# Patient Record
Sex: MALE | Race: WHITE | NOT HISPANIC OR LATINO | Employment: OTHER | URBAN - METROPOLITAN AREA
[De-identification: names, ages, dates, MRNs, and addresses within clinical notes are randomized per-mention and may not be internally consistent; named-entity substitution may affect disease eponyms.]

---

## 2018-06-27 ENCOUNTER — COMMUNICATION - HEALTHEAST (OUTPATIENT)
Dept: TELEHEALTH | Facility: CLINIC | Age: 43
End: 2018-06-27

## 2018-06-27 ENCOUNTER — OFFICE VISIT - HEALTHEAST (OUTPATIENT)
Dept: FAMILY MEDICINE | Facility: CLINIC | Age: 43
End: 2018-06-27

## 2018-06-27 DIAGNOSIS — H81.10 BPPV (BENIGN PAROXYSMAL POSITIONAL VERTIGO): ICD-10-CM

## 2018-06-27 DIAGNOSIS — F32.5 MAJOR DEPRESSION IN COMPLETE REMISSION (H): ICD-10-CM

## 2018-06-27 ASSESSMENT — MIFFLIN-ST. JEOR: SCORE: 1739.94

## 2019-03-29 ENCOUNTER — OFFICE VISIT - HEALTHEAST (OUTPATIENT)
Dept: FAMILY MEDICINE | Facility: CLINIC | Age: 44
End: 2019-03-29

## 2019-03-29 DIAGNOSIS — H91.92 HEARING LOSS OF LEFT EAR, UNSPECIFIED HEARING LOSS TYPE: ICD-10-CM

## 2019-03-29 DIAGNOSIS — H93.13 TINNITUS, BILATERAL: ICD-10-CM

## 2019-03-29 DIAGNOSIS — R42 DIZZINESS: ICD-10-CM

## 2019-03-29 DIAGNOSIS — R09.81 CONGESTION OF PARANASAL SINUS: ICD-10-CM

## 2019-04-16 ENCOUNTER — OFFICE VISIT - HEALTHEAST (OUTPATIENT)
Dept: OTOLARYNGOLOGY | Facility: CLINIC | Age: 44
End: 2019-04-16

## 2019-04-16 DIAGNOSIS — H93.13 TINNITUS OF BOTH EARS: ICD-10-CM

## 2019-04-16 DIAGNOSIS — G50.1 ATYPICAL FACIAL PAIN: ICD-10-CM

## 2019-05-08 ENCOUNTER — RECORDS - HEALTHEAST (OUTPATIENT)
Dept: ADMINISTRATIVE | Facility: OTHER | Age: 44
End: 2019-05-08

## 2019-05-25 ENCOUNTER — COMMUNICATION - HEALTHEAST (OUTPATIENT)
Dept: FAMILY MEDICINE | Facility: CLINIC | Age: 44
End: 2019-05-25

## 2019-05-25 DIAGNOSIS — R42 DIZZINESS: ICD-10-CM

## 2019-05-25 DIAGNOSIS — H91.92 HEARING LOSS OF LEFT EAR, UNSPECIFIED HEARING LOSS TYPE: ICD-10-CM

## 2019-05-25 DIAGNOSIS — R09.81 CONGESTION OF PARANASAL SINUS: ICD-10-CM

## 2019-05-25 DIAGNOSIS — H93.13 TINNITUS, BILATERAL: ICD-10-CM

## 2019-12-17 ENCOUNTER — OFFICE VISIT - HEALTHEAST (OUTPATIENT)
Dept: FAMILY MEDICINE | Facility: CLINIC | Age: 44
End: 2019-12-17

## 2019-12-17 DIAGNOSIS — G51.0 BELL'S PALSY: ICD-10-CM

## 2019-12-17 ASSESSMENT — ANXIETY QUESTIONNAIRES
4. TROUBLE RELAXING: NOT AT ALL
1. FEELING NERVOUS, ANXIOUS, OR ON EDGE: SEVERAL DAYS
GAD7 TOTAL SCORE: 1
7. FEELING AFRAID AS IF SOMETHING AWFUL MIGHT HAPPEN: NOT AT ALL
5. BEING SO RESTLESS THAT IT IS HARD TO SIT STILL: NOT AT ALL
2. NOT BEING ABLE TO STOP OR CONTROL WORRYING: NOT AT ALL
6. BECOMING EASILY ANNOYED OR IRRITABLE: NOT AT ALL
IF YOU CHECKED OFF ANY PROBLEMS ON THIS QUESTIONNAIRE, HOW DIFFICULT HAVE THESE PROBLEMS MADE IT FOR YOU TO DO YOUR WORK, TAKE CARE OF THINGS AT HOME, OR GET ALONG WITH OTHER PEOPLE: NOT DIFFICULT AT ALL
3. WORRYING TOO MUCH ABOUT DIFFERENT THINGS: NOT AT ALL

## 2019-12-17 ASSESSMENT — MIFFLIN-ST. JEOR: SCORE: 1712.72

## 2019-12-17 ASSESSMENT — PATIENT HEALTH QUESTIONNAIRE - PHQ9: SUM OF ALL RESPONSES TO PHQ QUESTIONS 1-9: 1

## 2019-12-31 ENCOUNTER — OFFICE VISIT - HEALTHEAST (OUTPATIENT)
Dept: FAMILY MEDICINE | Facility: CLINIC | Age: 44
End: 2019-12-31

## 2019-12-31 DIAGNOSIS — F32.5 MAJOR DEPRESSION IN COMPLETE REMISSION (H): ICD-10-CM

## 2019-12-31 DIAGNOSIS — Z00.00 ROUTINE GENERAL MEDICAL EXAMINATION AT A HEALTH CARE FACILITY: ICD-10-CM

## 2019-12-31 RX ORDER — FEXOFENADINE HCL 60 MG/1
60 TABLET, FILM COATED ORAL DAILY
Status: SHIPPED | COMMUNITY
Start: 2019-12-31 | End: 2024-01-28

## 2019-12-31 ASSESSMENT — MIFFLIN-ST. JEOR: SCORE: 1708.19

## 2020-11-04 ENCOUNTER — OFFICE VISIT - HEALTHEAST (OUTPATIENT)
Dept: FAMILY MEDICINE | Facility: CLINIC | Age: 45
End: 2020-11-04

## 2020-11-04 DIAGNOSIS — F32.5 MAJOR DEPRESSION IN COMPLETE REMISSION (H): ICD-10-CM

## 2020-11-04 DIAGNOSIS — F41.1 GAD (GENERALIZED ANXIETY DISORDER): ICD-10-CM

## 2020-11-04 RX ORDER — LORAZEPAM 0.5 MG/1
0.5 TABLET ORAL EVERY 6 HOURS PRN
Qty: 15 TABLET | Refills: 0 | Status: SHIPPED | OUTPATIENT
Start: 2020-11-04 | End: 2023-05-12

## 2020-11-26 ENCOUNTER — COMMUNICATION - HEALTHEAST (OUTPATIENT)
Dept: FAMILY MEDICINE | Facility: CLINIC | Age: 45
End: 2020-11-26

## 2020-11-26 DIAGNOSIS — F32.5 MAJOR DEPRESSION IN COMPLETE REMISSION (H): ICD-10-CM

## 2020-11-26 DIAGNOSIS — F41.1 GAD (GENERALIZED ANXIETY DISORDER): ICD-10-CM

## 2020-12-10 ENCOUNTER — OFFICE VISIT - HEALTHEAST (OUTPATIENT)
Dept: FAMILY MEDICINE | Facility: CLINIC | Age: 45
End: 2020-12-10

## 2020-12-10 DIAGNOSIS — Z13.0 SCREENING FOR DEFICIENCY ANEMIA: ICD-10-CM

## 2020-12-10 DIAGNOSIS — H00.014 HORDEOLUM EXTERNUM LEFT UPPER EYELID: ICD-10-CM

## 2020-12-10 DIAGNOSIS — F41.1 GAD (GENERALIZED ANXIETY DISORDER): ICD-10-CM

## 2020-12-10 DIAGNOSIS — F33.41 RECURRENT MAJOR DEPRESSIVE DISORDER, IN PARTIAL REMISSION (H): ICD-10-CM

## 2020-12-10 DIAGNOSIS — Z13.220 SCREENING FOR CHOLESTEROL LEVEL: ICD-10-CM

## 2020-12-10 DIAGNOSIS — Z13.21 ENCOUNTER FOR VITAMIN DEFICIENCY SCREENING: ICD-10-CM

## 2020-12-10 DIAGNOSIS — Z13.1 SCREENING FOR DIABETES MELLITUS: ICD-10-CM

## 2020-12-10 DIAGNOSIS — Z00.00 ROUTINE GENERAL MEDICAL EXAMINATION AT A HEALTH CARE FACILITY: ICD-10-CM

## 2020-12-10 LAB
ANION GAP SERPL CALCULATED.3IONS-SCNC: 8 MMOL/L (ref 5–18)
BUN SERPL-MCNC: 17 MG/DL (ref 8–22)
CALCIUM SERPL-MCNC: 9.3 MG/DL (ref 8.5–10.5)
CHLORIDE BLD-SCNC: 105 MMOL/L (ref 98–107)
CHOLEST SERPL-MCNC: 126 MG/DL
CO2 SERPL-SCNC: 29 MMOL/L (ref 22–31)
CREAT SERPL-MCNC: 0.93 MG/DL (ref 0.7–1.3)
ERYTHROCYTE [DISTWIDTH] IN BLOOD BY AUTOMATED COUNT: 11.2 % (ref 11–14.5)
FASTING STATUS PATIENT QL REPORTED: YES
GFR SERPL CREATININE-BSD FRML MDRD: >60 ML/MIN/1.73M2
GLUCOSE BLD-MCNC: 88 MG/DL (ref 70–125)
HCT VFR BLD AUTO: 45.4 % (ref 40–54)
HDLC SERPL-MCNC: 59 MG/DL
HGB BLD-MCNC: 15.9 G/DL (ref 14–18)
LDLC SERPL CALC-MCNC: 55 MG/DL
MCH RBC QN AUTO: 31.6 PG (ref 27–34)
MCHC RBC AUTO-ENTMCNC: 35 G/DL (ref 32–36)
MCV RBC AUTO: 90 FL (ref 80–100)
PLATELET # BLD AUTO: 165 THOU/UL (ref 140–440)
PMV BLD AUTO: 8.5 FL (ref 7–10)
POTASSIUM BLD-SCNC: 4.2 MMOL/L (ref 3.5–5)
RBC # BLD AUTO: 5.02 MILL/UL (ref 4.4–6.2)
SODIUM SERPL-SCNC: 142 MMOL/L (ref 136–145)
TRIGL SERPL-MCNC: 62 MG/DL
WBC: 5.1 THOU/UL (ref 4–11)

## 2020-12-10 RX ORDER — ESCITALOPRAM OXALATE 10 MG/1
10 TABLET ORAL DAILY
Qty: 90 TABLET | Refills: 3 | Status: SHIPPED | OUTPATIENT
Start: 2020-12-10 | End: 2022-01-03

## 2020-12-10 ASSESSMENT — ANXIETY QUESTIONNAIRES
2. NOT BEING ABLE TO STOP OR CONTROL WORRYING: NOT AT ALL
1. FEELING NERVOUS, ANXIOUS, OR ON EDGE: NOT AT ALL
3. WORRYING TOO MUCH ABOUT DIFFERENT THINGS: NOT AT ALL
5. BEING SO RESTLESS THAT IT IS HARD TO SIT STILL: NOT AT ALL
GAD7 TOTAL SCORE: 0
6. BECOMING EASILY ANNOYED OR IRRITABLE: NOT AT ALL
7. FEELING AFRAID AS IF SOMETHING AWFUL MIGHT HAPPEN: NOT AT ALL
4. TROUBLE RELAXING: NOT AT ALL

## 2020-12-10 ASSESSMENT — PATIENT HEALTH QUESTIONNAIRE - PHQ9: SUM OF ALL RESPONSES TO PHQ QUESTIONS 1-9: 2

## 2020-12-10 ASSESSMENT — MIFFLIN-ST. JEOR: SCORE: 1705.58

## 2020-12-11 LAB
25(OH)D3 SERPL-MCNC: 45.8 NG/ML (ref 30–80)
25(OH)D3 SERPL-MCNC: 45.8 NG/ML (ref 30–80)

## 2021-03-27 ENCOUNTER — COMMUNICATION - HEALTHEAST (OUTPATIENT)
Dept: FAMILY MEDICINE | Facility: CLINIC | Age: 46
End: 2021-03-27

## 2021-04-12 ENCOUNTER — OFFICE VISIT - HEALTHEAST (OUTPATIENT)
Dept: FAMILY MEDICINE | Facility: CLINIC | Age: 46
End: 2021-04-12

## 2021-04-12 DIAGNOSIS — F41.1 GAD (GENERALIZED ANXIETY DISORDER): ICD-10-CM

## 2021-04-12 DIAGNOSIS — F32.5 MAJOR DEPRESSION IN COMPLETE REMISSION (H): ICD-10-CM

## 2021-04-12 DIAGNOSIS — F80.89 SOCIAL COMMUNICATION DISORDER: ICD-10-CM

## 2021-04-15 ENCOUNTER — COMMUNICATION - HEALTHEAST (OUTPATIENT)
Dept: FAMILY MEDICINE | Facility: CLINIC | Age: 46
End: 2021-04-15

## 2021-05-26 ASSESSMENT — PATIENT HEALTH QUESTIONNAIRE - PHQ9
SUM OF ALL RESPONSES TO PHQ QUESTIONS 1-9: 2
SUM OF ALL RESPONSES TO PHQ QUESTIONS 1-9: 1

## 2021-05-27 NOTE — PROGRESS NOTES
HPI: This patient is a 45yo M who presents to the clinic for evaluation of episodic ear pain at the request of Dr. Azevedo. He has a bit of a complex history of L>R tinnitus that was worked up a few years ago in [place]. Per his history, nothing was found in his ear or on the hearing nerve, but he did have a something in his brain that was watched with repeat MRIs and didn't change; he was told not to worry about it, but does not know what he was diagnosed with. He has had symptoms of facial pressure, ear pressure, lightheadedness and mental fogginess that were worse a few months ago. He was going through more stress at that time, but is unaware of dental grinding or clenching.  He does state that his tinnitus has a pulsatile quality when he works out. This quality change has been present since before he had all of his scans and no vascular issues were identified per his history. Denies otorrhea, hearing loss, tinnitus, true vertigo, and other major symptoms such as fever, weight loss, odynophagia, dysphagia, and hemoptysis.     Past medical history, surgical history, social history, family history, medications, and allergies have been reviewed with the patient and are documented above.    Review of Systems: a 10-system review was performed. Pertinent positives are noted in the HPI and on a separate scanned document in the chart.    PHYSICAL EXAMINATION:  GEN: no acute distress, normocephalic  EYES: extraocular movements are intact, pupils are equal and round. Sclera clear.   EARS: auricles are normally formed. The external auditory canals are clear with minimal to no cerumen. Tympanic membranes are intact bilaterally with no signs of infection, effusion, retractions, or perforations.  NOSE: anterior nares are patent. There are no masses or lesions. The septum is non-obstructing.  OC/OP: clear, dentition is in good repair but with mild flattening and wear facets on the occlusal surfaces. The tongue and palate are  fully mobile and symmetric. No masses or lesions.  NECK: soft and supple. No lymphadenopathy or masses. Airway is midline. Mild tenderness of the bilateral TMJ.  NEURO: CN VII and XII symmetric. alert and oriented. No spontaneous nystagmus. Gait is normal.  PULM: breathing comfortably on room air, normal chest expansion with respiration  CARDS: no cyanosis or clubbing. Normal carotid pulses.    MEDICAL DECISION-MAKING: This patient is a 45yo M with a constellation of symptoms that is pretty indicative of TMD. Discussed soft diet, NSAIDS, and a bite guard. If these measures do not make an impact in his symptoms, we can further investigate with an audiogram and new scans. Did discuss repeating scans now, but he was not interested in that at this time, as the first time around for this was quite stressful for him. He will continue his nasal spray for now has it has helped a little with mild allergic nasal congestion. Did advise him to use the adult dosing, which is 2 sprays per nostril daily.

## 2021-05-27 NOTE — PROGRESS NOTES
[facility] Clinic Note    Patient Name: Gregory Nolasco  Patient Age: 44 y.o.  YOB: 1975  MRN: [id number]    Date of visit: 3/29/2019    Assessment/Plan:  No results found for this or any previous visit (from the past 24 hour(s)).  No medications were ordered this encounter      ICD-10-CM    1. Congestion of paranasal sinus R09.81    2. Hearing loss of left ear, unspecified hearing loss type H91.92    3. Dizziness R42        He does seem to have some decreased hearing on the left side, will have him see ear nose and throat for evaluation for this.  Dizziness seems to be associated with sinus congestion so we will try treating that and see if the symptoms get better.  We discussed using Flonase and/or sinus rinses.  He tolerated the Flonase previously so we will try that again.      Counseled patient regarding treatments, treatment options, risks and benefits and diagnosis.  The patient was interactive, attentive, verbalized understanding, and we discussed plan.       Patient Active Problem List   Diagnosis     Major depression in complete remission (H)     BPPV (benign paroxysmal positional vertigo)     Social History     Social History Narrative     Not on file     Family History   Problem Relation Age of Onset     Arthritis Mother      Depression Father      Hypertension Father      Depression Sister      Depression Paternal Uncle      Stroke Maternal Grandmother      Arthritis Maternal Grandfather      Depression Paternal Grandmother      Outpatient Encounter Medications as of 3/29/2019   Medication Sig Dispense Refill     fluticasone (FLONASE ALLERGY RELIEF) 50 mcg/actuation nasal spray 1 spray into each nostril daily.       [DISCONTINUED] DULoxetine (CYMBALTA) 30 MG capsule Take 1 capsule (30 mg total) by mouth daily. 90 capsule 0     No facility-administered encounter medications on file as of 3/29/2019.        Chief Complaint:   Chief Complaint   Patient presents with     Nasal Congestion      pt states ongoing sinus issue and sx      Facial Pain     Dizziness     pt states having dizziness with sinus sx which is why pt is here        /82 (Patient Site: Left Arm, Patient Position: Sitting, Cuff Size: Adult Regular)   Pulse 100   Resp 16   Wt 179 lb (81.2 kg)   BMI 22.98 kg/m    HPI:   Has had sinus congestion since January.  Has had one URI February.      Description: feels like room is slowly moving.  Lasts couple hours. Only has with nasal congestion. None now.   Head injury or other known inciting event: no  Nausea or vomiting: mild nausea    Has tinnitus bilateral x 20 years, has been to ENT, neg MRI.  Hearing has been ok.      History of vertigo: last summer    Headache: no    History of cva, diabetes, known cardiovascular disease: no  diplopia, dysarthria, dysphagia, weakness, or numbness: no      Able to walk without falling: Yes    At present, nasal congestion is present.  Tried flonase, seemed to be effective but stopped.      Has not done sinus rinses.    Feels some pressure over bridge of nose, this comes and goes.       ROS: Pertinent ros findings in hpi, all other systems negative.    Objective/Physical Exam:     /82 (Patient Site: Left Arm, Patient Position: Sitting, Cuff Size: Adult Regular)   Pulse 100   Resp 16   Wt 179 lb (81.2 kg)   BMI 22.98 kg/m      Heart: Regular rhythm, no rubs or gallops.  Normal rate: y  Murmur: n    Lungs:   Clear to auscultation bilaterally: y  Wheeze: n  Rhonchi: n  Crackles: n  Area of decreased breath sounds: n  Labored breathing: n      Left eye:  Conjunctival erythema: n  Purulent drainage: n  Proptosis:n    Right eye:  Conjunctival erythema: n  Purulent drainage: n  Proptosis: n    Right tympanic membrane:   color: pale  ossicles visualized: y  umbo distorted: n  cone of light distorted: n  Air/fluid levels: n  Drainage:n  Canal: not edematous no discharge  Pain with external ear manipulation: n  Foreign body: n    Left tympanic  membrane:   color: pale  ossicles visualized: y  umbo distorted: n  cone of light distorted: n  Air/fluid levels: n  Drainage: n  Canal: not edematous no discharge  Pain with external ear manipulation: n  Foreign body: n    No auricular protrusion or erythema/swelling over mastoid area bilaterally.    No white patches that scrape off, no deviation of uvula. no ulcerations noted.    No torticollis, neck stiffness, drooling, muffled/hot potato voice, submandibular swelling, trismus or stridor.    Pharynx:   Erythema: n  Pus pockets: n  Tender cervical lymphadenopathy: n    Well appearing: y  Moist mucous membranes: y    MSK: no muscle or joint swelling  Neuro: no dysarthria or gross asymmetry  Psych: full affect, oriented x 3  Hematologic: no petechiae or purpura    Skin: No rash.     Tender over left maxillary sinus: no  Tender over right maxillary sinus: no  Tender over left frontal sinus: no  Tender over right frontal sinus: no    EOMI, no proptosis    Nares patent    Extraocular movements intact, eyes track equally in all directions. Pupils same size bilaterally and equally reactive to light: y  Finger rub hearing is present and the same bilaterally: slight decreased left  Slurring or other significant speech abnormality: n  Smile symmetric bilaterally: y      Bilateral upper and lower extremity strength:  Shoulder abduction and adduction: 5/5  Elbow flexion/extension: 5/5  Wrist flexion/extension: 5/5   5/5        Knee extension: 5/5    Patellar DTR's: 2/4 bilaterally      Tone: normal      Dysdiadochokinesis: n    Pronator drift: absent    Cognition: Alert and oriented x 3    No nystagmus noted.      Skew deviation: no          Richmond Stoner MD

## 2021-05-28 ASSESSMENT — ANXIETY QUESTIONNAIRES
GAD7 TOTAL SCORE: 1
GAD7 TOTAL SCORE: 0

## 2021-05-29 NOTE — TELEPHONE ENCOUNTER
Refill Approved    Rx renewed per Medication Renewal Policy. Medication was last renewed on 3/29/19.    Tamra Orozco, [facility] Triage/Med Refill 5/26/2019     Requested Prescriptions   Pending Prescriptions Disp Refills     fluticasone propionate (FLONASE) 50 mcg/actuation nasal spray [Pharmacy Med Name: FLUTICASONE PROP 50 MCG SPRAY]  0     Sig: SPRAY 1 SPRAY INTO EACH NOSTRIL EVERY DAY       Nasal Steroid Refill Protocol Passed - 5/25/2019  8:27 AM        Passed - Patient has had office visit/physical in last 2 years     Last office visit with prescriber/PCP: 3/29/2019 OR same dept: 3/29/2019 Richmond Stoner MD OR same specialty: 3/29/2019 Richmond Stoner MD Last physical: Visit date not found Last MTM visit: Visit date not found    Next appt within 3 mo: Visit date not found  Next physical within 3 mo: Visit date not found  Prescriber OR PCP: Richmond Stoner MD  Last diagnosis associated with med order: 1. Congestion of paranasal sinus  - fluticasone propionate (FLONASE) 50 mcg/actuation nasal spray [Pharmacy Med Name: FLUTICASONE PROP 50 MCG SPRAY]; SPRAY 1 SPRAY INTO EACH NOSTRIL EVERY DAY; Refill: 0    2. Hearing loss of left ear, unspecified hearing loss type  - fluticasone propionate (FLONASE) 50 mcg/actuation nasal spray [Pharmacy Med Name: FLUTICASONE PROP 50 MCG SPRAY]; SPRAY 1 SPRAY INTO EACH NOSTRIL EVERY DAY; Refill: 0    3. Dizziness  - fluticasone propionate (FLONASE) 50 mcg/actuation nasal spray [Pharmacy Med Name: FLUTICASONE PROP 50 MCG SPRAY]; SPRAY 1 SPRAY INTO EACH NOSTRIL EVERY DAY; Refill: 0    4. Tinnitus, bilateral  - fluticasone propionate (FLONASE) 50 mcg/actuation nasal spray [Pharmacy Med Name: FLUTICASONE PROP 50 MCG SPRAY]; SPRAY 1 SPRAY INTO EACH NOSTRIL EVERY DAY; Refill: 0     If protocol passes may refill for 12 months if within 3 months of last provider visit (or a total of 15 months).

## 2021-06-01 VITALS — HEIGHT: 74 IN | BODY MASS INDEX: 22.07 KG/M2 | WEIGHT: 172 LBS

## 2021-06-02 ENCOUNTER — RECORDS - HEALTHEAST (OUTPATIENT)
Dept: ADMINISTRATIVE | Facility: OTHER | Age: 46
End: 2021-06-02

## 2021-06-02 VITALS — WEIGHT: 179 LBS | BODY MASS INDEX: 22.98 KG/M2

## 2021-06-03 VITALS
OXYGEN SATURATION: 96 % | DIASTOLIC BLOOD PRESSURE: 62 MMHG | WEIGHT: 166 LBS | BODY MASS INDEX: 21.3 KG/M2 | HEART RATE: 81 BPM | HEIGHT: 74 IN | SYSTOLIC BLOOD PRESSURE: 138 MMHG

## 2021-06-03 VITALS
HEART RATE: 76 BPM | BODY MASS INDEX: 21.17 KG/M2 | WEIGHT: 165 LBS | SYSTOLIC BLOOD PRESSURE: 106 MMHG | DIASTOLIC BLOOD PRESSURE: 70 MMHG | HEIGHT: 74 IN

## 2021-06-04 NOTE — PROGRESS NOTES
"Assessment & Plan   1. Bell's palsy  Discussed etiology, treatment, expectations for resolution of symptoms. He has already begun to see significant improvement.  Symptoms now very mild. Will complete prednisone and valacyclovir tomorrow. Recommended ocular lubricant.  Recheck in two weeks when he will be back for his physical.   - propylene glycol (SYSTANE COMPLETE) 0.6 % Drop; Apply 2 drops to eye 3 (three) times a day.  Dispense: 1 Bottle; Refill: 1    Maame Azevedo CNP    Subjective   Chief Complaint:  Follow-up (Bell's Palsy , regions, 12/11/19)    HPI:   Gregory Nolasco is a 44 y.o. male who presents for follow up for [place] Palsy.      He was seen in the ED 12/11 with facial swelling, tingling and hypotonia on left side of face.  Palsy in C7 distribution. Treated with prednisone and valacyclovir and here today for follow up.  He states the palsy symptoms have already improved significantly.  Able to close eye completely though does feel quite dry.  Not using lubrication.  When chewing some difficulty with leakage out of the left corner of mouth.  No difficulty swallowing.  No vision changes.  Has had some mild dizziness with the prednisone.       Allergies:  is allergic to house dust.    SH/FH:  Social History and Family History reviewed and updated.   Tobacco Status:  He  reports that he has never smoked. He has never used smokeless tobacco.    Review of Systems:  A complete head to toe ROS is negative unless otherwise noted in HPI    Objective     Vitals:    12/17/19 1455   BP: 138/62   Pulse: 81   SpO2: 96%   Weight: 166 lb (75.3 kg)   Height: 6' 2\" (1.88 m)       Physical Exam:  GENERAL: Alert, well-appearing male .   PSYCH: Pleasant mood, affect appropriate.    SKIN: No lesions, edema  HEAD: Normocephalic, atraumatic  EYES: Conjunctiva pink, sclera white, no exudates. ALVARO.  EOMs intact.   EARS: TMs pearly grey, no bulging, redness, retraction.   MOUTH: Pharynx moist, pink without exudate. No tonsillar " enlargement  NECK: No lymphadenopathy.   NEURO: Very slight asymmetry of lips with puffing of cheeks, otherwise normal neuro exam

## 2021-06-04 NOTE — PROGRESS NOTES
MALE PREVENTIVE EXAM    Assessment & Plan   1. Routine general medical examination at a health care facility  Healthy male. Exercising regularly, healthy diet.  Reviewed labs from 2018 and mild elevation in triglycerides.  Moderate alcohol intake, advised on decreasing.  Not fasting today so will plan to repeat labs in one year.     2. Major depression in complete remission (H)  Symptoms in complete remission, now no longer on antidepressant. Encouraged him to reach out if depression symptoms return and would restart duloxetine.     Alcohol use, safety and moderation discussed.  Recommended adequate calcium intake/osteoporosis prevention.  Discussed colon cancer screening at age 50, 45 if -American.  Diet discuss, including moderation of portions sizes, avoiding eating out and fast food and increase in fruits and vegetables.      Maame Azevedo, CNP    Subjective:   Chief Complaint:  Annual Exam (not fasting)    HPI:  Gregory Nolasco is a 44 y.o. male who presents for routine physical exam.    He was seen two weeks ago for follow up for [place] Palsy.  Initially symptoms noted 12/10 and evaluated in ED. He states he has continued to note improvement and really notes no residual symptoms currently.  No difficulty chewing or swallowing. No further eye dryness. Normal motor movement and sensation.     Diet is largely plant based. Rare meat and dairy products.  Takes B12 and D supplements.      Depression:  Previously on Duloxetine 30mg. Stopped one year ago.  Feels depression has been very well controlled.  Mild anxiety in the fall but this resolved on its own.  Felt related to job stress.  On and off medication for many years. Able to go off during periods of remission and then restart.     Preventive Health:  Reviewed and recommended screening and treatment recommendations:  PSA: no FMH  Immunizations: up to date    Health Habits:    Exercise: regular cardio and resistance training at [facility]  Balanced Diet:  "yes  Seat Belt Use: YES  Calcium intake/Osteoporosis prevention: yes  Guns: NO  Sun Screen: YES  Dental Care: YES    PMH:   Patient Active Problem List   Diagnosis     Major depression in complete remission (H)       No past medical history on file.    Current Medications: Reviewed  Current Outpatient Medications on File Prior to Visit   Medication Sig Dispense Refill     cholecalciferol, vitamin D3, 1,000 unit (25 mcg) tablet Take 1,000 Units by mouth daily.       cyanocobalamin 1000 MCG tablet Take 1,000 mcg by mouth daily.       fexofenadine (ALLEGRA) 60 MG tablet Take 60 mg by mouth daily.       fluticasone propionate (FLONASE) 50 mcg/actuation nasal spray SPRAY 1 SPRAY INTO EACH NOSTRIL EVERY DAY 16 g 3     propylene glycol (SYSTANE COMPLETE) 0.6 % Drop Apply 2 drops to eye 3 (three) times a day. 1 Bottle 1     predniSONE (DELTASONE) 20 MG tablet        No current facility-administered medications on file prior to visit.        Allergies: Reviewed   is allergic to house dust.    Social History:  Social History     Occupational History     Not on file   Tobacco Use     Smoking status: Never Smoker     Smokeless tobacco: Never Used   Substance and Sexual Activity     Alcohol use: Not on file     Drug use: Not on file     Sexual activity: Not on file       Family History:   Family History   Problem Relation Age of Onset     Arthritis Mother      Depression Father      Hypertension Father      Depression Sister      Depression Paternal Uncle      Stroke Maternal Grandmother      Arthritis Maternal Grandfather      Depression Paternal Grandmother          Review of Systems:  Complete head to toe review of systems is otherwise negative except as above.    Objective:    /70 (Patient Site: Left Arm, Patient Position: Sitting, Cuff Size: Adult Large)   Pulse 76   Ht 6' 2\" (1.88 m)   Wt 165 lb (74.8 kg)   BMI 21.18 kg/m      GENERAL: Alert, well-appearing male.   PSYCH: Pleasant mood, affect appropriate.  Good " judgment and insight.   SKIN: No atypical lesions  EYES: Conjunctiva pink, sclera white, no exudates. ALVARO.  EOMs intact. Corneal light reflex bilaterally, red reflex present. Undilated fundoscopic exam normal  EARS: TMs pearly grey, no bulging, redness, retraction.   MOUTH: Pharynx moist, pink without exudate. No tonsillar enlargement  NECK: No lymphadenopathy. Thyroid borders smooth without enlargement, nodules.   CV: Regular rate and rhythm without murmurs, rubs or gallops.  RESP: Lung sounds clear  ABDOMEN: BS+. Abdomen soft.  No organomegaly, masses or hernias  :  Normal scrotum.  Testes descended bilaterally without mass or hernia. Penis circumcised without lesions or discharge.  PV : No edema

## 2021-06-05 VITALS
HEART RATE: 68 BPM | DIASTOLIC BLOOD PRESSURE: 74 MMHG | SYSTOLIC BLOOD PRESSURE: 118 MMHG | BODY MASS INDEX: 20.12 KG/M2 | WEIGHT: 161.8 LBS | HEIGHT: 75 IN

## 2021-06-12 NOTE — PROGRESS NOTES
"Gregory Nolasco is a 45 y.o. adult who is being evaluated via a billable video visit.      The patient has been notified of following:     \"This video visit will be conducted via a call between you and your physician/provider. We have found that certain health care needs can be provided without the need for an in-person physical exam.  This service lets us provide the care you need with a video conversation.  If a prescription is necessary we can send it directly to your pharmacy.  If lab work is needed we can place an order for that and you can then stop by our lab to have the test done at a later time.    Video visits are billed at different rates depending on your insurance coverage. Please reach out to your insurance provider with any questions.    If during the course of the call the physician/provider feels a video visit is not appropriate, you will not be charged for this service.\"    Patient has given verbal consent to a Video visit? Yes  How would you like to obtain your AVS? AVS Preference: MyChart.  If dropped by the video visit, the video invitation should be sent to: Send to e-mail at: [email or web address]  Will anyone else be joining your video visit? No        Video Start Time: 2:08 PM    Additional provider notes:   Assessment & Plan   1. Major depression in complete remission (H)  Worsening depression symptoms.  Has previously done well with duloxetine, however this was difficult to wean off of for him.  Sertraline was effective for a short period of time.  Will try Lexapro. Advised on side effects, monitoring.  Follow up in one month for recheck.  Encouraged to reach out sooner if mood worsens  - escitalopram oxalate (LEXAPRO) 10 MG tablet; Take 1 tablet (10 mg total) by mouth daily.  Dispense: 30 tablet; Refill: 2    2. LAZARO (generalized anxiety disorder)  As above.  Lorazepam as needed for acute anxiety episodes until the Lexapro takes effect. Advised on risks . Goal of tapering off of this once we " find an effective daily medication.   - escitalopram oxalate (LEXAPRO) 10 MG tablet; Take 1 tablet (10 mg total) by mouth daily.  Dispense: 30 tablet; Refill: 2  - LORazepam (ATIVAN) 0.5 MG tablet; Take 1 tablet (0.5 mg total) by mouth every 6 (six) hours as needed for anxiety.  Dispense: 15 tablet; Refill: 0    Maame Azevedo CNP    Subjective   Chief Complaint:  Anxiety and Depression    HPI:   Gregory Nolasco is a 45 y.o. adult who presents for anxiety and depression.      He has a history of depression.  Previously on duloxetine, stopped in 2018.  He states for the past several months depression and anxiety have been worsening . The past few days have been somewhat intolerable and this is when he decided to reach out.  Significant anxiety around the election.  Experiencing physical symptoms last night - feeling short of breath, tight in the chest.  Tried to do deep breathing exercises.  He does not follow with a therapist though feels he has plenty of support with family and friends. Open with his wife who is also on an antidepressant.     Prior to duloxetine, did try sertraline which was effective though eventually became ineffective.  Duloxetine was effective as well, however it was very difficult to come off of and for that reason he does not wish to try again.     LAZARO-7 = 13  PHQ-9 = 8    Allergies:  is allergic to house dust.    SH/FH:  Social History and Family History reviewed and updated.   Tobacco Status:  Gregory Nolasco  reports that Gregory Nolasco has never smoked. Gregory Nolasco has never used smokeless tobacco.    Review of Systems:  A complete head to toe ROS is negative unless otherwise noted in HPI    Objective   There were no vitals filed for this visit.    Physical Exam:  GENERAL: Alert, well-appearing  PSYCH: Pleasant mood, affect appropriate.  Good judgment and insight.  Intact recent and remote memory.  Good eye contact.            Video-Visit Details    Type of service:  Video  Visit    Video End Time (time video stopped): 2:26 PM  Originating Location (pt. Location): Home    Distant Location (provider location):  [facility]     Platform used for Video Visit: Roldan Azevedo CNP

## 2021-06-13 NOTE — PROGRESS NOTES
MALE PREVENTIVE EXAM    Assessment & Plan   1. Routine general medical examination at a health care facility  Fasting labs. Discussed CRC screening now recommended at age 45. Up to date on immunizations    2. Recurrent major depressive disorder, in partial remission (H)  Good improvement in symptoms of depression and anxiety since starting Lexapro.  Almost complete remission. Has noted some waking at night so recommended trial of dosing in the morning. Follow up if any changes, recommended treating for at least six months.    - escitalopram oxalate (LEXAPRO) 10 MG tablet; Take 1 tablet (10 mg total) by mouth daily.  Dispense: 90 tablet; Refill: 3    3. LAZARO (generalized anxiety disorder)  As above, now well controlled  - escitalopram oxalate (LEXAPRO) 10 MG tablet; Take 1 tablet (10 mg total) by mouth daily.  Dispense: 90 tablet; Refill: 3    4. Screening for cholesterol level  - Lipid Cascade    5. Screening for diabetes mellitus  - Basic Metabolic Panel    6. Screening for deficiency anemia  - HM2(CBC w/o Differential)    7. Encounter for vitamin deficiency screening  - Vitamin D, Total (25-Hydroxy)    8. Hordeolum externum left upper eyelid  Looks consistent with simple stye. Warm compresses.     Recommended adequate calcium intake/osteoporosis prevention.  Discussed colon cancer screening at age 50, 45 if -American.  Diet discuss, including moderation of portions sizes, avoiding eating out and fast food and increase in fruits and vegetables.      Maame Azevedo CNP    Subjective:   Chief Complaint:  Annual Exam (fasting labs, med check, mole on back that would like to be checked )    HPI:  Gregory Nolasco is a 45 y.o. adult who presents for routine physical exam.    Plant based diet.  Takes B12 and D    Depression:  Recurrent.  Recent worsening of depression and anxiety symptoms this summer/fall.  Significant anxiety with election.  Had previously been on duloxetine but found that hard to come off of so  for that reason we chose to switch and prescribe Lexapro 11/4.  He states he feels the medication is working quite well.  Depression symptoms now very mild. Anxiety almost absent. Does note waking in the middle of the night consistently which is new but is able to fall back asleep. No other side effects noted.      Painful left eyelid.  Mild drainage. Vision okay.     Preventive Health:  Reviewed and recommended screening and treatment recommendations:  PSA: no FMH  Colonoscopy:  No FMH  Immunizations: up to date    Health Habits:    Exercise: yes, walking daily  Balanced Diet: yes  Seat Belt Use: YES  Calcium intake/Osteoporosis prevention: yes  Guns: NO  Sun Screen: YES  Dental Care: YES    PMH:   Patient Active Problem List   Diagnosis     Major depression in complete remission (H)       No past medical history on file.    Current Medications: Reviewed  Current Outpatient Medications on File Prior to Visit   Medication Sig Dispense Refill     cholecalciferol, vitamin D3, 1,000 unit (25 mcg) tablet Take 1,000 Units by mouth daily.       cyanocobalamin 1000 MCG tablet Take 1,000 mcg by mouth daily.       escitalopram oxalate (LEXAPRO) 10 MG tablet TAKE 1 TABLET BY MOUTH EVERY DAY 30 tablet 10     fexofenadine (ALLEGRA) 60 MG tablet Take 60 mg by mouth daily.       LORazepam (ATIVAN) 0.5 MG tablet Take 1 tablet (0.5 mg total) by mouth every 6 (six) hours as needed for anxiety. 15 tablet 0     propylene glycol (SYSTANE COMPLETE) 0.6 % Drop Apply 2 drops to eye 3 (three) times a day. 1 Bottle 1     No current facility-administered medications on file prior to visit.        Allergies: Reviewed   is allergic to house dust.    Social History:  Social History     Occupational History     Not on file   Tobacco Use     Smoking status: Never Smoker     Smokeless tobacco: Never Used   Substance and Sexual Activity     Alcohol use: Not on file     Drug use: Not on file     Sexual activity: Not on file       Family History:  "  Family History   Problem Relation Age of Onset     Arthritis Mother      Depression Father      Hypertension Father      Depression Sister      Depression Paternal Uncle      Stroke Maternal Grandmother      Arthritis Maternal Grandfather      Depression Paternal Grandmother          Review of Systems:  Complete head to toe review of systems is otherwise negative except as above.    Objective:    /74   Pulse 68   Ht 6' 2.75\" (1.899 m)   Wt 161 lb 12.8 oz (73.4 kg)   BMI 20.36 kg/m      GENERAL: Alert, well-appearing male.   PSYCH: Pleasant mood, affect appropriate.  Good judgment and insight.   SKIN: No atypical lesions  EYES: Conjunctiva pink, sclera white, no exudates. ALVARO.  EOMs intact. Left upper lid with mild edema, erythema. No drainage or exudates  EARS: TMs pearly grey, no bulging, redness, retraction.   MOUTH: Pharynx moist, pink without exudate. No tonsillar enlargement  NECK: No lymphadenopathy. Thyroid borders smooth without enlargement, nodules.   CV: Regular rate and rhythm without murmurs, rubs or gallops.  RESP: Lung sounds clear  ABDOMEN: BS+. Abdomen soft.  No organomegaly, masses or hernias  :  Normal scrotum.  Testes descended bilaterally without mass or hernia. Penis circumcised without lesions or discharge.  PV : No edema        "

## 2021-06-13 NOTE — TELEPHONE ENCOUNTER
Refill Approved    Rx renewed per Medication Renewal Policy. Medication was last renewed on 11/4/2020.  Last  Office visit 11/4/2020    Melva Burt, [facility] Triage/Med Refill 11/26/2020     Requested Prescriptions   Pending Prescriptions Disp Refills     escitalopram oxalate (LEXAPRO) 10 MG tablet [Pharmacy Med Name: ESCITALOPRAM 10 MG TABLET] 30 tablet 2     Sig: TAKE 1 TABLET BY MOUTH EVERY DAY       [facility] Refill Protocol  Passed - 11/26/2020  1:31 PM        Passed - PCP or prescribing provider visit in last year     Last office visit with prescriber/PCP: 12/17/2019 Maame Azevedo CNP OR same dept: Visit date not found OR same specialty: 12/17/2019 Maame Azevedo CNP  Last physical: 12/31/2019 Last MTM visit: Visit date not found   Next visit within 3 mo: Visit date not found  Next physical within 3 mo: Visit date not found  Prescriber OR PCP: Maame Azevedo CNP  Last diagnosis associated with med order: 1. Major depression in complete remission (H)  - escitalopram oxalate (LEXAPRO) 10 MG tablet [Pharmacy Med Name: ESCITALOPRAM 10 MG TABLET]; TAKE 1 TABLET BY MOUTH EVERY DAY  Dispense: 30 tablet; Refill: 2    2. LAZARO (generalized anxiety disorder)  - escitalopram oxalate (LEXAPRO) 10 MG tablet [Pharmacy Med Name: ESCITALOPRAM 10 MG TABLET]; TAKE 1 TABLET BY MOUTH EVERY DAY  Dispense: 30 tablet; Refill: 2    If protocol passes may refill for 12 months if within 3 months of last provider visit (or a total of 15 months).

## 2021-06-16 PROBLEM — F32.5 MAJOR DEPRESSION IN COMPLETE REMISSION (H): Status: ACTIVE | Noted: 2018-06-27

## 2021-06-16 NOTE — PROGRESS NOTES
Gregory Nolasco is a 46 y.o. adult who is being evaluated via a billable video visit.      How would you like to obtain your AVS? MyChart.  If dropped from the video visit, the video invitation should be resent by: Text to cell phone: [phone]  Will anyone else be joining your video visit? No      Video Start Time:  2:25P    1. Social communication disorder  Concern for possible Autism Spectrum Disorder diagnosis. He has many symptoms consistent with this since childhood though has never undergone workup.  He is interested in this now to provide more clarity and perhaps services.  Will reach out to him when I locate a psychiatric provider who specializes in this workup and begin referral process.     2. LAZARO (generalized anxiety disorder)  Feels anxiety is generally well controlled with Lexapro.  Continues to experience social anxiety though manageable.      3. Major depression in complete remission (H)  Depression well controlled with Lexapro.     Subjective   Gregory Nolasco is 46 y.o. and presents today for the following health issues   HPI     He states his main concern today is considering workup for autism spectrum disorder.  He states in the past other friends and family members have mentioned this as a possibility to him.  More recently he has done some reading and feels symptoms are consistent for him.      He has struggled with socializing and making conversation since he was a child.  He prefers to be independent.  Does well with routines and struggles with anxiety when anything strays from routine.  Experiences anxiety with changes and in social environments.  Repetitive movements are comforting for him.  He denies any relational difficulties with his wife. He denies any history of academic difficulties.  States he did participate in therapy for a fine motor delay when he was a child.  He is unaware of any difficulty reading others' emotions.     History of depression and anxiety. He feels this is  well controlled with Lexapro currently.  This was restarted in the fall. Has not had to use lorazepam for some time.      Review of Systems  Negative unless otherwise noted in HPI      Objective       Vitals:  No vitals were obtained today due to virtual visit.    Physical Exam  Alert, well appearing.  Pleasant mood, affect appropriate.       Video-Visit Details    Type of service:  Video Visit    Video End Time (time video stopped): 2:40 PM  Originating Location (pt. Location): Home    Distant Location (provider location):  [facility]     Platform used for Video Visit: FlyData

## 2021-06-18 NOTE — PROGRESS NOTES
Assessment & Plan   1. Major depression in complete remission (H):  Currently in remission. Discussed long history of recurrent depression and risk for future episode.  Benefit of continuous treatment to prevent future episode.  He still prefers to taper off of the medication when he feels it is not needed.  Mood has been stable for 6-12 months so I do think this is reasonable at this time.  Will plan to wait a couple of months until he is settled in his apartment. Would then decrease to 20mg for two weeks and stop.  He will send a [employer] message with an update when he decides to do so.   - DULoxetine (CYMBALTA) 30 MG capsule; Take 1 capsule (30 mg total) by mouth daily.  Dispense: 90 capsule; Refill: 0    2. BPPV (benign paroxysmal positional vertigo):  Reviewed ED notes from [facility], symptoms consistent with peripheral vertigo. He is feeling much better.  Discussed diagnosis, possible triggers for him including his allergies, Epley maneuver and consideration for further workup if this becomes a recurrent problem.  He will follow up as needed.      Maame Azevedo CNP    Subjective   Chief Complaint:  Dizziness (was seen at [facility] for this - pt states he feels better but if he moves his head too fast) and Establish Care    HPI:   Gregory Nolasco is a 43 y.o. male who presents for establish care, dizziness.   He has been seen at [facility] [place] in the past.  PMH notable for depression, otherwise negative.      He was seen at [facility] ED 6/24 with complaints of acute onset dizziness, nausea and vomiting.  Normal neuro exam. Given IVF, benadryl with improvement. Diagnosed with BPPV.  He states since that time symptoms have improved considerably.  Really only intense for one day.  He still experiences mild dizziness when turning his head to the side.  No further N/V.  Does have a history of tinnitus x20 years that he states has been worked up before without explanation.  No hearing loss. Seasonal allergies as  "well. He is taking Flonase for that.     Depression:  Currently on duloxetine 30mg.  20 year history of depression.  Has been on and off of medication. Prefers to treat when needed and in between not use medication.  He has previously tried sertraline (felt too intense) and bupropion (no complaints).  Duloxetine has been very effective for him.  He denies depression currently.  Occasional low mood or feeling bad about himself though no anhedonia, difficulties with sleep or appetite. He recently moved back to [place] from [place].  Living with his mom while he locates and apartment. 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      Allergies:  has No Known Allergies.    SH/FH:  Social History and Family History reviewed and updated.   Tobacco Status:  He  reports that he has never smoked. He has never used smokeless tobacco.    Review of Systems:  A complete head to toe ROS is negative unless otherwise noted in HPI    Objective     Vitals:    06/27/18 0929   BP: 120/74   Patient Site: Right Arm   Patient Position: Sitting   Cuff Size: Adult Large   Pulse: 87   SpO2: 97%   Weight: 172 lb (78 kg)   Height: 6' 2\" (1.88 m)       Physical Exam:  GENERAL: Alert, well-appearing male  PSYCH: Pleasant mood. Very quiet. Avoidant eye contact. Good judgment and insight.  Intact recent and remote memory.    SKIN: No lesions, erythema, edema.   HEAD: Normocephalic, atraumatic  EYES: Conjunctiva pink, sclera white, no exudates. ALVARO.  EOMs intact. Lateral nystagmus noted bilaterally.   EARS: TMs pearly grey, no bulging, redness, retraction.   NOSE: Nares patent, no discharge.  Normal nasal mucosa, septum and turbinates.  MOUTH: Pharynx moist, pink without exudate. No tonsillar enlargement  NECK: No lymphadenopathy.   NEURO: Alert, oriented x3 CNs 2-12 intact. DTRs 2/4. Normal motor and sensory        "

## 2021-06-20 NOTE — LETTER
Letter by Maame Azevedo CNP at      Author: Maame Azevedo CNP Service: -- Author Type: --    Filed:  Encounter Date: 12/17/2019 Status: Signed                    My Depression Action Plan  Name: Gregory Nolasco   Date of Birth 1975  Date: 12/17/2019    My Doctor: Maame Azevedo CNP   My Clinic: [facility]  [address] [address]  [phone]          GREEN    ZONE   Good Control    What it looks like:     Things are going generally well. You have normal ups and downs. You may even feel depressed from time to time, but bad moods usually last less than a day.   What you need to do:  1. Continue to care for yourself (see self care plan)  2. Check your depression survival kit and update it as needed  3. Follow your physicians recommendations including any medication.  4. Do not stop taking medication unless you consult with your physician first.           YELLOW         ZONE Getting Worse    What it looks like:     Depression is starting to interfere with your life.     It may be hard to get out of bed; you may be starting to isolate yourself from others.    Symptoms of depression are starting to last most all day and this has happened for several days.     You may have suicidal thoughts but they are not constant.   What you need to do:     1. Call your care team, your response to treatment will improve if you keep your care team informed of your progress. Yellow periods are signs an adjustment may need to be made.     2. Continue your self-care, even if you have to fake it!    3. Talk to someone in your support network    4. Open up your depression survival kit           RED    ZONE Medical Alert - Get Help    What it looks like:     Depression is seriously interfering with your life.     You may experience these or other symptoms: You cant get out of bed most days, cant work or engage in other necessary activities, you have trouble taking care of basic hygiene, or basic  responsibilities, thoughts of suicide or death that will not go away, self-injurious behavior.     What you need to do:  1. Call your care team and request a same-day appointment. If they are not available (weekends or after hours) call your local crisis line, emergency room or 911.            Depression Self Care Plan / Survival Kit    Self-Care for Depression  Heres the deal. Your body and mind are really not as separate as most people think.  What you do and think affects how you feel and how you feel influences what you do and think. This means if you do things that people who feel good do, it will help you feel better.  Sometimes this is all it takes.  There is also a place for medication and therapy depending on how severe your depression is, so be sure to consult with your medical provider and/ or Behavioral Health Consultant if your symptoms are worsening or not improving.     In order to better manage my stress, I will:    Exercise  Get some form of exercise, every day. This will help reduce pain and release endorphins, the feel good chemicals in your brain. This is almost as good as taking antidepressants!  This is not the same as joining a gym and then never going! (they count on that by the way?) It can be as simple as just going for a walk or doing some gardening, anything that will get you moving.      Hygiene   Maintain good hygiene (Get out of bed in the morning, Make your bed, Brush your teeth, Take a shower, and Get dressed like you were going to work, even if you are unemployed).  If your clothes don't fit try to get ones that do.    Diet  I will strive to eat foods that are good for me, drink plenty of water, and avoid excessive sugar, caffeine, alcohol, and other mood-altering substances.  Some foods that are helpful in depression are: complex carbohydrates, B vitamins, flaxseed, fish or fish oil, fresh fruits and vegetables.    Psychotherapy  I agree to participate in Individual Therapy (if  recommended).    Medication  If prescribed medications, I agree to take them.  Missing doses can result in serious side effects.  I understand that drinking alcohol, or other illicit drug use, may cause potential side effects.  I will not stop my medication abruptly without first discussing it with my provider.    Staying Connected With Others  I will stay in touch with my friends, family members, and my primary care provider/team.    Use your imagination  Be creative.  We all have a creative side; it doesnt matter if its oil painting, sand castles, or mud pies! This will also kick up the endorphins.    Witness Beauty  (AKA stop and smell the roses) Take a look outside, even in mid-winter. Notice colors, textures. Watch the squirrels and birds.     Service to others  Be of service to others.  There is always someone else in need.  By helping others we can get out of ourselves and remember the really important things.  This also provides opportunities for practicing all the other parts of the program.    Humor  Laugh and be silly!  Adjust your TV habits for less news and crime-drama and more comedy.    Control your stress  Try breathing deep, massage therapy, biofeedback, and meditation. Find time to relax each day.     My support system    Clinic Contact: Maame Azevedo DNP Phone number: [phone]   Contact 1:  Phone number:    Contact 2:  Phone number:    Oriental orthodox/:  Phone number:    Therapist:  Phone number:    Local crisis center:    Phone number:    Other community support:  Phone number:

## 2021-06-26 ENCOUNTER — HEALTH MAINTENANCE LETTER (OUTPATIENT)
Age: 46
End: 2021-06-26

## 2021-10-16 ENCOUNTER — HEALTH MAINTENANCE LETTER (OUTPATIENT)
Age: 46
End: 2021-10-16

## 2022-01-03 ENCOUNTER — OFFICE VISIT (OUTPATIENT)
Dept: FAMILY MEDICINE | Facility: CLINIC | Age: 47
End: 2022-01-03
Payer: COMMERCIAL

## 2022-01-03 VITALS
DIASTOLIC BLOOD PRESSURE: 70 MMHG | WEIGHT: 176.5 LBS | BODY MASS INDEX: 21.95 KG/M2 | OXYGEN SATURATION: 97 % | HEART RATE: 80 BPM | SYSTOLIC BLOOD PRESSURE: 124 MMHG | HEIGHT: 75 IN

## 2022-01-03 DIAGNOSIS — Z12.11 SCREENING FOR COLON CANCER: ICD-10-CM

## 2022-01-03 DIAGNOSIS — Z00.00 ROUTINE GENERAL MEDICAL EXAMINATION AT A HEALTH CARE FACILITY: Primary | ICD-10-CM

## 2022-01-03 DIAGNOSIS — F33.42 RECURRENT MAJOR DEPRESSIVE DISORDER, IN FULL REMISSION (H): ICD-10-CM

## 2022-01-03 DIAGNOSIS — F41.1 GAD (GENERALIZED ANXIETY DISORDER): ICD-10-CM

## 2022-01-03 DIAGNOSIS — F84.0 AUTISM SPECTRUM DISORDER: ICD-10-CM

## 2022-01-03 PROCEDURE — 99396 PREV VISIT EST AGE 40-64: CPT | Performed by: NURSE PRACTITIONER

## 2022-01-03 RX ORDER — ESCITALOPRAM OXALATE 5 MG/1
5 TABLET ORAL DAILY
Qty: 90 TABLET | Refills: 3 | Status: SHIPPED | OUTPATIENT
Start: 2022-01-03 | End: 2023-01-25

## 2022-01-03 ASSESSMENT — MIFFLIN-ST. JEOR: SCORE: 1768.29

## 2022-01-03 ASSESSMENT — PATIENT HEALTH QUESTIONNAIRE - PHQ9: SUM OF ALL RESPONSES TO PHQ QUESTIONS 1-9: 7

## 2022-01-03 NOTE — PROGRESS NOTES
SUBJECTIVE:   CC: Gregory Nolasco is an 46 year old male who presents for preventative health visit.     He states he has been well. No changes to health.      Since his last visit he did undergo psychological evaluation and was diagnosed with Autism Spectrum Disorder.  He states he felt a bit relieved receiving this diagnosis as he felt it explained quite a bit about himself.  He has made a couple changes such as wearing blue light glasses which he feels has helped. He continues to feel mental health is well controlled with Lexapro.  He did decrease from 10mg to 5mg as he was having trouble sleeping and he feels depression and anxiety are still controlled with 5mg.      Mom with adenomatous polyp on colonoscopy.  He will be 47 in two weeks.     Patient has been advised of split billing requirements and indicates understanding: Yes  Healthy Habits:     Getting at least 3 servings of Calcium per day:  Yes    Bi-annual eye exam:  Yes    Dental care twice a year:  Yes    Sleep apnea or symptoms of sleep apnea:  None    Diet:  Vegetarian/vegan    Frequency of exercise:  2-3 days/week    Duration of exercise:  15-30 minutes    Taking medications regularly:  Yes    Barriers to taking medications:  None    Medication side effects:  None    PHQ-2 Total Score: 2    Additional concerns today:  Yes              Today's PHQ-2 Score:   PHQ-2 ( 1999 Pfizer) 1/2/2022   Q1: Little interest or pleasure in doing things 1   Q2: Feeling down, depressed or hopeless 1   PHQ-2 Score 2   Q1: Little interest or pleasure in doing things Several days   Q2: Feeling down, depressed or hopeless Several days   PHQ-2 Score 2       Abuse: Current or Past(Physical, Sexual or Emotional)- No  Do you feel safe in your environment? Yes    Social History     Tobacco Use     Smoking status: Never Smoker     Smokeless tobacco: Never Used   Substance Use Topics     Alcohol use: Not on file         Alcohol Use 1/2/2022   Prescreen: >3 drinks/day or >7  drinks/week? No       Last PSA: No results found for: PSA    Reviewed orders with patient. Reviewed health maintenance and updated orders accordingly - Yes      Reviewed and updated as needed this visit by clinical staff   Allergies  Meds             Reviewed and updated as needed this visit by Provider                   Review of Systems  CONSTITUTIONAL: NEGATIVE for fever, chills, change in weight  INTEGUMENTARY/SKIN: NEGATIVE for worrisome rashes, moles or lesions  EYES: NEGATIVE for vision changes or irritation  ENT: NEGATIVE for ear, mouth and throat problems  RESP: NEGATIVE for significant cough or SOB  CV: NEGATIVE for chest pain, palpitations or peripheral edema  GI: NEGATIVE for nausea, abdominal pain, heartburn, or change in bowel habits   male: negative for dysuria, hematuria, decreased urinary stream, erectile dysfunction, urethral discharge  MUSCULOSKELETAL: NEGATIVE for significant arthralgias or myalgia  NEURO: NEGATIVE for weakness, dizziness or paresthesias  PSYCHIATRIC: NEGATIVE for changes in mood or affect    OBJECTIVE:   There were no vitals taken for this visit.    Physical Exam  GENERAL: healthy, alert and no distress  EYES: Eyes grossly normal to inspection, PERRL and conjunctivae and sclerae normal  HENT: ear canals and TM's normal, nose and mouth without ulcers or lesions  NECK: no adenopathy, no asymmetry, masses, or scars and thyroid normal to palpation  RESP: lungs clear to auscultation - no rales, rhonchi or wheezes  CV: regular rate and rhythm, normal S1 S2, no S3 or S4, no murmur, click or rub, no peripheral edema and peripheral pulses strong  ABDOMEN: soft, nontender, no hepatosplenomegaly, no masses and bowel sounds normal  MS: no gross musculoskeletal defects noted, no edema  SKIN: no suspicious lesions or rashes  NEURO: Normal strength and tone, mentation intact and speech normal  PSYCH: mentation appears normal, affect normal/bright    Diagnostic Test Results:  Labs reviewed  "in Epic    ASSESSMENT/PLAN:   1. Routine general medical examination at a health care facility  Fasting labs normal one year ago, will forego today as he has no other risk factors.  No FMH prostate cancer. Discussed recommendation for CRC screening beginning at age 45.  His mom was incidentally recently found to have a polyp on her colonoscopy.  He is agreeable to an order for this.      2. Screening for colon cancer  As above, screening and FMH polyps in mother  - Adult Gastro Ref - Procedure Only; Future    3. LAZARO (generalized anxiety disorder)  Continues to feel anxiety is well controlled with Lexapro.  Has dropped down to 5mg due to insomnia side effect and doing well with this.   - escitalopram (LEXAPRO) 5 MG tablet; Take 1 tablet (5 mg) by mouth daily  Dispense: 90 tablet; Refill: 3    4. Autism spectrum disorder  Recent diagnosis with psychology    5. Recurrent major depressive disorder, in full remission (H)  Well controlled with Lexapro    Patient has been advised of split billing requirements and indicates understanding: Yes  COUNSELING:   Reviewed preventive health counseling, as reflected in patient instructions    Estimated body mass index is 20.36 kg/m  as calculated from the following:    Height as of 12/10/20: 1.899 m (6' 2.75\").    Weight as of 12/10/20: 73.4 kg (161 lb 12.8 oz).         Gregory Nolasco reports that Gregory Nolasco has never smoked. Gregory Nolasco has never used smokeless tobacco.      Counseling Resources:  ATP IV Guidelines  Pooled Cohorts Equation Calculator  FRAX Risk Assessment  ICSI Preventive Guidelines  Dietary Guidelines for Americans, 2010  USDA's MyPlate  ASA Prophylaxis  Lung CA Screening    Maame Azevedo, [facility]  "

## 2022-01-21 ENCOUNTER — TELEPHONE (OUTPATIENT)
Dept: GASTROENTEROLOGY | Facility: CLINIC | Age: 47
End: 2022-01-21
Payer: COMMERCIAL

## 2022-01-21 DIAGNOSIS — Z11.59 ENCOUNTER FOR SCREENING FOR OTHER VIRAL DISEASES: Primary | ICD-10-CM

## 2022-01-21 NOTE — TELEPHONE ENCOUNTER
Screening Questions  Blue=prep questions Red=location Green=sedation   1. Are you active on mychart? Yes     2. What insurance is in the chart? [employer]      3.  Ordering/Referring Provider: Maame Azevedo CNP    4. BMI 21.9, If greater than 40 review exclusion criteria also will need EXTENDED PREP    5.  Respiratory Screening (If yes to any of the following HOSPITAL setting only):     Do you use daily home oxygen? N  Do you have mod to severe Obstructive Sleep Apnea? N (can be seen at [facility] or hospital setting)    Do you have Pulmonary Hypertension? N   Do you have UNCONTROLLED asthma? N    6. Have you had a heart or lung transplant? N  (If yes, please review exclusion criteria)    7. Are you currently on dialysis?N  (If yes, schedule in HOSPITAL setting only)(If yes, please send Golytely prep)    8. Do you have chronic kidney disease? N (If yes, please send Golytely prep)    9. Have you had a stroke or Transient ischemic attack (TIA) within 6 months? N (If yes, do not schedule at [facility])    10. In the past 6 months, have you had any heart related issues including cardiomyopathy or heart attack?   (If yes, please review exclusion criteria)           If yes, did it require cardiac stenting or other implantable device?N  (If yes, please review exclusion criteria)      11. Do you have any implantable devices in your body (pacemaker, defib, LVAD)? N (If yes, schedule at [facility])    12. Do you take nitroglycerin? If yes, how often? N (if yes, schedule at HOSPITAL setting)    13. Are you currently taking any blood thinners?N (If yes- inform patient to follow up with PCP or provider for follow up instructions)     14. Are you a diabetic? N (If yes, please send Golytely prep)    15. (Females) Are you currently pregnant?   If yes, how many weeks?      16. Are you taking any prescription pain medications on a routine schedule? N If yes, MAC sedation and patient will need EXTENDED PREP.    17. Do you have any chemical dependencies such as  alcohol, street drugs, or methadone? N If yes, MAC sedation     18. Do you have any history of post-traumatic stress syndrome, severe anxiety or history of psychosis? Y - ANXIETY   If yes, MAC sedation.     19. Do you transfer independently? Y    20.  Do you have any issues with constipation? N   If yes, pt will need EXTENDED PREP     21. Preferred Pharmacy for Pre Prescription [facility]/ on Chart     Scheduling Details    Which Colonoscopy Prep was Sent?:    Type of Procedure Scheduled: Colonoscopy   Surgeon: Keara   Date of Procedure: 02/02/2022  Location: [facility]   Caller (Please ask for phone number if not scheduled by patient): Gregory Nolasco       Sedation Type: MAC   Conscious Sedation- Needs  for 6 hours after the procedure  MAC/General-Needs  for 24 hours after procedure    Pre-op Required at [facility] [place], [place] and OR for MAC sedation: N  (if yes advise patient they will need a pre-op prior to procedure)      Informed patient they will need an adult  Y  Cannot take any type of public or medical transportation alone    Pre-Procedure Covid test to be completed at [facility] or Externally: Y    Confirmed Nurse will call to complete assessment Y    Additional comments: N ([facility] PATI[facility] NEED EXTENDED PREP)

## 2022-01-25 ENCOUNTER — TELEPHONE (OUTPATIENT)
Dept: GASTROENTEROLOGY | Facility: CLINIC | Age: 47
End: 2022-01-25
Payer: COMMERCIAL

## 2022-01-25 NOTE — TELEPHONE ENCOUNTER
Attempted to contact patient regarding upcoming colonoscopy procedure on 2.2.2022 for pre assessment questions. No answer.     Left message to return call to [phone] #2    Covid test scheduled: 1.31.2022    Arrival time: 0730    Facility location: [facility]    Sedation type: MAC    Indication for procedure: screening colonoscopy, [facility]    Referring provider: Maame Azevedo CNP    Bowel prep recommendation: Miralax/Magnesium citrate/Dulcolax    Mariann Mckeon RN

## 2022-01-26 NOTE — TELEPHONE ENCOUNTER
Returning pt's call.    Pre assessment questions completed for upcoming colonoscopy procedure scheduled on 2.2.2022    COVID test scheduled 1.31.2022    Reviewed procedural arrival time 0730 and facility location [facility].    Designated  policy reviewed. Instructed to have someone stay 24 hours post procedure.     Anticoagulation/blood thinners? no    Electronic implanted devices? no    Reviewed Miralax/Magnesium citrate/Dulcolax prep instructions with patient. No fiber/iron supplements or foods that contain nuts/seeds prior to procedure.     Patient verbalized understanding and had no questions or concerns at this time.    Mariann Mckeon RN

## 2022-01-31 ENCOUNTER — LAB (OUTPATIENT)
Dept: LAB | Facility: CLINIC | Age: 47
End: 2022-01-31
Payer: COMMERCIAL

## 2022-01-31 DIAGNOSIS — Z11.59 ENCOUNTER FOR SCREENING FOR OTHER VIRAL DISEASES: ICD-10-CM

## 2022-01-31 PROCEDURE — U0005 INFEC AGEN DETEC AMPLI PROBE: HCPCS

## 2022-01-31 PROCEDURE — U0003 INFECTIOUS AGENT DETECTION BY NUCLEIC ACID (DNA OR RNA); SEVERE ACUTE RESPIRATORY SYNDROME CORONAVIRUS 2 (SARS-COV-2) (CORONAVIRUS DISEASE [COVID-19]), AMPLIFIED PROBE TECHNIQUE, MAKING USE OF HIGH THROUGHPUT TECHNOLOGIES AS DESCRIBED BY CMS-2020-01-R: HCPCS

## 2022-02-01 LAB — SARS-COV-2 RNA RESP QL NAA+PROBE: NEGATIVE

## 2022-02-02 ENCOUNTER — DOCUMENTATION ONLY (OUTPATIENT)
Dept: GASTROENTEROLOGY | Facility: OUTPATIENT CENTER | Age: 47
End: 2022-02-02
Payer: COMMERCIAL

## 2022-02-02 ENCOUNTER — TRANSFERRED RECORDS (OUTPATIENT)
Dept: HEALTH INFORMATION MANAGEMENT | Facility: CLINIC | Age: 47
End: 2022-02-02
Payer: COMMERCIAL

## 2022-02-02 DIAGNOSIS — Z12.11 SCREENING FOR COLON CANCER: ICD-10-CM

## 2022-02-02 PROCEDURE — 88305 TISSUE EXAM BY PATHOLOGIST: CPT | Mod: 26 | Performed by: PATHOLOGY

## 2022-02-02 PROCEDURE — 88305 TISSUE EXAM BY PATHOLOGIST: CPT | Mod: TC,ORL | Performed by: SURGERY

## 2022-02-03 ENCOUNTER — LAB REQUISITION (OUTPATIENT)
Dept: LAB | Facility: CLINIC | Age: 47
End: 2022-02-03
Payer: COMMERCIAL

## 2022-02-07 LAB
PATH REPORT.COMMENTS IMP SPEC: NORMAL
PATH REPORT.COMMENTS IMP SPEC: NORMAL
PATH REPORT.FINAL DX SPEC: NORMAL
PATH REPORT.GROSS SPEC: NORMAL
PATH REPORT.MICROSCOPIC SPEC OTHER STN: NORMAL
PATH REPORT.RELEVANT HX SPEC: NORMAL
PHOTO IMAGE: NORMAL

## 2022-04-12 ENCOUNTER — OFFICE VISIT (OUTPATIENT)
Dept: AUDIOLOGY | Facility: CLINIC | Age: 47
End: 2022-04-12
Payer: COMMERCIAL

## 2022-04-12 DIAGNOSIS — H90.42 SENSORINEURAL HEARING LOSS (SNHL) OF LEFT EAR WITH UNRESTRICTED HEARING OF RIGHT EAR: Primary | ICD-10-CM

## 2022-04-12 DIAGNOSIS — H93.A2 PULSATILE TINNITUS OF LEFT EAR: ICD-10-CM

## 2022-04-12 PROCEDURE — 99207 PR NO CHARGE LOS: CPT | Performed by: AUDIOLOGIST

## 2022-04-12 PROCEDURE — 92550 TYMPANOMETRY & REFLEX THRESH: CPT | Performed by: AUDIOLOGIST

## 2022-04-12 PROCEDURE — 92557 COMPREHENSIVE HEARING TEST: CPT | Performed by: AUDIOLOGIST

## 2022-04-12 NOTE — PROGRESS NOTES
AUDIOLOGY REPORT    SUMMARY: Audiology visit completed. See audiogram for results. Abuse screening not completed due to same day appt with ENT clinic, where this is addressed.      RECOMMENDATIONS: Follow-up with ENT.      Dayana Shields, [facility]  [place] Licensed Audiologist [id number]

## 2022-04-21 ENCOUNTER — OFFICE VISIT (OUTPATIENT)
Dept: OTOLARYNGOLOGY | Facility: CLINIC | Age: 47
End: 2022-04-21
Payer: COMMERCIAL

## 2022-04-21 DIAGNOSIS — H90.3 SNHL (SENSORY-NEURAL HEARING LOSS), ASYMMETRICAL: ICD-10-CM

## 2022-04-21 DIAGNOSIS — H93.A2 PULSATILE TINNITUS OF LEFT EAR: Primary | ICD-10-CM

## 2022-04-21 PROCEDURE — 99203 OFFICE O/P NEW LOW 30 MIN: CPT | Performed by: OTOLARYNGOLOGY

## 2022-04-21 NOTE — LETTER
4/21/2022         RE: Gregory Nolasco  [address] [place] [address]        Dear Colleague,    Thank you for referring your patient, Gregory Nolasco, to the [facility]. Please see a copy of my visit note below.    HPI: This patient is a now 46yo M who presents to the clinic for evaluation of episodic ear pain and pulstaile tinnitus. He was seen for this in 2019. He has a bit of a complex history of L>R tinnitus that was worked up a few years ago in [place]. Per his history, nothing was found in his ear or on the hearing nerve, but he did have a something in his brain that was watched with repeat MRIs and didn't change; he was told not to worry about it, but does not know what he was diagnosed with. He previously has had a pulsatile tinnitus only after he works out. Now, he notices it much more often and it is louder, only in the left ear. Denies otorrhea, hearing loss, tinnitus, true vertigo, and other major symptoms such as fever, weight loss, odynophagia, dysphagia, and hemoptysis.      Past medical history, surgical history, social history, family history, medications, and allergies have been reviewed with the patient and are documented above.     Review of Systems: a 10-system review was performed. Pertinent positives are noted in the HPI and on a separate scanned document in the chart.     PHYSICAL EXAMINATION:  GEN: no acute distress, normocephalic  EYES: extraocular movements are intact, pupils are equal and round. Sclera clear.   EARS: auricles are normally formed. The external auditory canals are clear with minimal to no cerumen. Tympanic membranes are intact bilaterally with no signs of infection, effusion, retractions, or perforations.  NOSE: anterior nares are patent. There are no masses or lesions. The septum is non-obstructing.  OC/OP: clear, dentition is in good repair but with mild flattening and wear facets on the occlusal surfaces. The tongue and  palate are fully mobile and symmetric. No masses or lesions.  NECK: soft and supple. No lymphadenopathy or masses. Airway is midline. Mild tenderness of the bilateral TMJ.  NEURO: CN VII and XII symmetric. alert and oriented. No spontaneous nystagmus. Gait is normal.  PULM: breathing comfortably on room air, normal chest expansion with respiration  CARDS: no cyanosis or clubbing. Normal carotid pulses.    AUDIOGRAM: mild sloping SNHL with a mild asymmetry in the left ear     MEDICAL DECISION-MAKING: This patient is a 46yo M with pulsatile tinnitus. Per report, this was worked up many years ago in [place], but we do not have much in the way of records. His audio today shows an asymmetry, which he reports looks similar to years past. He also has a louder, more persistent pulsatile tinnitus on the left side. It is reasonable to investigate this with current imaging. Will order and will call him with the results.       Again, thank you for allowing me to participate in the care of your patient.        Sincerely,        Tamra Lucero MD

## 2022-04-21 NOTE — PROGRESS NOTES
HPI: This patient is a now 48yo M who presents to the clinic for evaluation of episodic ear pain and pulstaile tinnitus. He was seen for this in 2019. He has a bit of a complex history of L>R tinnitus that was worked up a few years ago in [place]. Per his history, nothing was found in his ear or on the hearing nerve, but he did have a something in his brain that was watched with repeat MRIs and didn't change; he was told not to worry about it, but does not know what he was diagnosed with. He previously has had a pulsatile tinnitus only after he works out. Now, he notices it much more often and it is louder, only in the left ear. Denies otorrhea, hearing loss, tinnitus, true vertigo, and other major symptoms such as fever, weight loss, odynophagia, dysphagia, and hemoptysis.      Past medical history, surgical history, social history, family history, medications, and allergies have been reviewed with the patient and are documented above.     Review of Systems: a 10-system review was performed. Pertinent positives are noted in the HPI and on a separate scanned document in the chart.     PHYSICAL EXAMINATION:  GEN: no acute distress, normocephalic  EYES: extraocular movements are intact, pupils are equal and round. Sclera clear.   EARS: auricles are normally formed. The external auditory canals are clear with minimal to no cerumen. Tympanic membranes are intact bilaterally with no signs of infection, effusion, retractions, or perforations.  NOSE: anterior nares are patent. There are no masses or lesions. The septum is non-obstructing.  OC/OP: clear, dentition is in good repair but with mild flattening and wear facets on the occlusal surfaces. The tongue and palate are fully mobile and symmetric. No masses or lesions.  NECK: soft and supple. No lymphadenopathy or masses. Airway is midline. Mild tenderness of the bilateral TMJ.  NEURO: CN VII and XII symmetric. alert and oriented. No spontaneous nystagmus. Gait is  normal.  PULM: breathing comfortably on room air, normal chest expansion with respiration  CARDS: no cyanosis or clubbing. Normal carotid pulses.    AUDIOGRAM: mild sloping SNHL with a mild asymmetry in the left ear     MEDICAL DECISION-MAKING: This patient is a 48yo M with pulsatile tinnitus. Per report, this was worked up many years ago in [place], but we do not have much in the way of records. His audio today shows an asymmetry, which he reports looks similar to years past. He also has a louder, more persistent pulsatile tinnitus on the left side. It is reasonable to investigate this with current imaging. Will order and will call him with the results.

## 2022-05-10 ENCOUNTER — HOSPITAL ENCOUNTER (OUTPATIENT)
Dept: CT IMAGING | Facility: CLINIC | Age: 47
Discharge: HOME | End: 2022-05-10
Attending: OTOLARYNGOLOGY | Admitting: OTOLARYNGOLOGY
Payer: COMMERCIAL

## 2022-05-10 DIAGNOSIS — H93.A2 PULSATILE TINNITUS OF LEFT EAR: ICD-10-CM

## 2022-05-10 PROCEDURE — 70496 CT ANGIOGRAPHY HEAD: CPT

## 2022-05-10 PROCEDURE — 250N000011 HC RX IP 250 OP 636: Performed by: OTOLARYNGOLOGY

## 2022-05-10 RX ORDER — IOPAMIDOL 755 MG/ML
100 INJECTION, SOLUTION INTRAVASCULAR ONCE
Status: COMPLETED | OUTPATIENT
Start: 2022-05-10 | End: 2022-05-10

## 2022-05-10 RX ADMIN — IOPAMIDOL 75 ML: 755 INJECTION, SOLUTION INTRAVENOUS at 15:02

## 2022-05-12 ENCOUNTER — TELEPHONE (OUTPATIENT)
Dept: OTOLARYNGOLOGY | Facility: CLINIC | Age: 47
End: 2022-05-12
Payer: COMMERCIAL

## 2022-05-12 NOTE — TELEPHONE ENCOUNTER
----- Message from Tamra Lucero MD sent at 5/11/2022  3:28 PM CDT -----  Can you call Gregory and inform him that the scan does not show any reason for hearing his heartbeat? His blood vessels look good. No further investigation needed.

## 2022-05-12 NOTE — TELEPHONE ENCOUNTER
Spoke with Gregory about his recent scan.  Per Dr. Lucero I informed him that the scan does not show any reason for hearing his heartbeat and his blood vessels look good.  Per Dr. Lucero there is no further investigation needed.  Patient expressed understanding if these results.    [facility]      Cyndy Upton RN  [facility]  ENT  [address] [address]  [email or web address]   Office:[phone]  Employed by [facility]

## 2022-09-25 ENCOUNTER — HEALTH MAINTENANCE LETTER (OUTPATIENT)
Age: 47
End: 2022-09-25

## 2023-01-22 ASSESSMENT — ENCOUNTER SYMPTOMS
JOINT SWELLING: 0
HEMATOCHEZIA: 0
DIARRHEA: 0
NERVOUS/ANXIOUS: 0
PALPITATIONS: 0
EYE PAIN: 0
FEVER: 0
SORE THROAT: 0
NAUSEA: 0
HEARTBURN: 0
ARTHRALGIAS: 0
CHILLS: 0
CONSTIPATION: 0
BREAST MASS: 0
PARESTHESIAS: 0
ABDOMINAL PAIN: 0
COUGH: 0
DYSURIA: 0
HEMATURIA: 0
DIZZINESS: 0
FREQUENCY: 0
SHORTNESS OF BREATH: 0
MYALGIAS: 1
HEADACHES: 0
WEAKNESS: 0

## 2023-01-25 ENCOUNTER — OFFICE VISIT (OUTPATIENT)
Dept: FAMILY MEDICINE | Facility: CLINIC | Age: 48
End: 2023-01-25
Payer: COMMERCIAL

## 2023-01-25 VITALS
HEIGHT: 75 IN | WEIGHT: 178.56 LBS | OXYGEN SATURATION: 96 % | DIASTOLIC BLOOD PRESSURE: 80 MMHG | HEART RATE: 77 BPM | SYSTOLIC BLOOD PRESSURE: 110 MMHG | BODY MASS INDEX: 22.2 KG/M2

## 2023-01-25 DIAGNOSIS — F90.0 ADHD (ATTENTION DEFICIT HYPERACTIVITY DISORDER), INATTENTIVE TYPE: ICD-10-CM

## 2023-01-25 DIAGNOSIS — F41.1 GAD (GENERALIZED ANXIETY DISORDER): ICD-10-CM

## 2023-01-25 DIAGNOSIS — F84.0 AUTISM SPECTRUM DISORDER: ICD-10-CM

## 2023-01-25 DIAGNOSIS — Z00.00 ROUTINE GENERAL MEDICAL EXAMINATION AT A HEALTH CARE FACILITY: Primary | ICD-10-CM

## 2023-01-25 DIAGNOSIS — J30.89 NON-SEASONAL ALLERGIC RHINITIS DUE TO OTHER ALLERGIC TRIGGER: ICD-10-CM

## 2023-01-25 DIAGNOSIS — F33.42 RECURRENT MAJOR DEPRESSIVE DISORDER, IN FULL REMISSION (H): ICD-10-CM

## 2023-01-25 PROBLEM — F90.9 ADHD (ATTENTION DEFICIT HYPERACTIVITY DISORDER): Status: ACTIVE | Noted: 2023-01-25

## 2023-01-25 PROBLEM — F32.9 MAJOR DEPRESSION: Status: ACTIVE | Noted: 2018-06-27

## 2023-01-25 PROCEDURE — 99213 OFFICE O/P EST LOW 20 MIN: CPT | Mod: 25 | Performed by: NURSE PRACTITIONER

## 2023-01-25 PROCEDURE — 99396 PREV VISIT EST AGE 40-64: CPT | Performed by: NURSE PRACTITIONER

## 2023-01-25 RX ORDER — METHYLPHENIDATE HYDROCHLORIDE 18 MG/1
TABLET, EXTENDED RELEASE ORAL
COMMUNITY
Start: 2022-11-01

## 2023-01-25 ASSESSMENT — ENCOUNTER SYMPTOMS
CHILLS: 0
NAUSEA: 0
ARTHRALGIAS: 0
HEADACHES: 0
JOINT SWELLING: 0
HEMATURIA: 0
ABDOMINAL PAIN: 0
FEVER: 0
SORE THROAT: 0
DYSURIA: 0
DIARRHEA: 0
NERVOUS/ANXIOUS: 0
SHORTNESS OF BREATH: 0
WEAKNESS: 0
PALPITATIONS: 0
CONSTIPATION: 0
COUGH: 0
MYALGIAS: 1
DIZZINESS: 0
EYE PAIN: 0
FREQUENCY: 0

## 2023-01-25 ASSESSMENT — PATIENT HEALTH QUESTIONNAIRE - PHQ9
SUM OF ALL RESPONSES TO PHQ QUESTIONS 1-9: 2
SUM OF ALL RESPONSES TO PHQ QUESTIONS 1-9: 2
10. IF YOU CHECKED OFF ANY PROBLEMS, HOW DIFFICULT HAVE THESE PROBLEMS MADE IT FOR YOU TO DO YOUR WORK, TAKE CARE OF THINGS AT HOME, OR GET ALONG WITH OTHER PEOPLE: SOMEWHAT DIFFICULT

## 2023-01-25 NOTE — PROGRESS NOTES
SUBJECTIVE:   CC: Gregory is an 48 year old who presents for preventative health visit.   Patient has been advised of split billing requirements and indicates understanding: Yes  Healthy Habits:     Getting at least 3 servings of Calcium per day:  Yes    Bi-annual eye exam:  Yes    Dental care twice a year:  Yes    Sleep apnea or symptoms of sleep apnea:  None    Diet:  Vegetarian/vegan    Frequency of exercise:  6-7 days/week    Duration of exercise:  30-45 minutes    Taking medications regularly:  Yes    Medication side effects:  Other    PHQ-2 Total Score: 0    Additional concerns today:  Yes        ADHD - feels that medication (methylphenidate is very helpful ) seen by outside prescriber for depression/anxiety/asd  Denies SI/HI    Does have a knot in neck- worse 2 weeks ago, got better and just came back     He has a vegan diet and exercises regularly, less than 3 sexual partners in the last year        Today's PHQ-2 Score:   PHQ-2 ( 1999 Pfizer) 1/22/2023   Q1: Little interest or pleasure in doing things 0   Q2: Feeling down, depressed or hopeless 0   PHQ-2 Score 0   Q1: Little interest or pleasure in doing things Not at all   Q2: Feeling down, depressed or hopeless Not at all   PHQ-2 Score 0       Have you ever done Advance Care Planning? (For example, a Health Directive, POLST, or a discussion with a medical provider or your loved ones about your wishes): Yes, advance care planning is on file.    Social History     Tobacco Use     Smoking status: Never     Smokeless tobacco: Never   Substance Use Topics     Alcohol use: Not on file       Alcohol Use 1/22/2023   Prescreen: >3 drinks/day or >7 drinks/week? No       Last PSA: No results found for: PSA    Reviewed orders with patient. Reviewed health maintenance and updated orders accordingly - Yes      Reviewed and updated as needed this visit by clinical staff   Tobacco  Allergies  Meds   Med Hx  Surg Hx  Fam Hx          Reviewed and updated as needed  "this visit by Provider       Med Hx  Surg Hx  Fam Hx             Review of Systems   Constitutional: Negative for chills and fever.   HENT: Negative for congestion, ear pain, hearing loss and sore throat.    Eyes: Negative for pain and visual disturbance.   Respiratory: Negative for cough and shortness of breath.    Cardiovascular: Negative for chest pain and palpitations.   Gastrointestinal: Negative for abdominal pain, constipation, diarrhea and nausea.   Genitourinary: Negative for dysuria, frequency, genital sores, hematuria and urgency.   Musculoskeletal: Positive for myalgias. Negative for arthralgias and joint swelling.   Skin: Negative for rash.   Neurological: Negative for dizziness, weakness and headaches.   Psychiatric/Behavioral: The patient is not nervous/anxious.          OBJECTIVE:   /80 (BP Location: Left arm, Patient Position: Sitting, Cuff Size: Adult Regular)   Pulse 77   Ht 1.905 m (6' 3\")   Wt 81 kg (178 lb 9 oz)   SpO2 96%   BMI 22.32 kg/m      Physical Exam  GENERAL: healthy, alert and no distress  EYES: Eyes grossly normal to inspection, PERRL and conjunctivae and sclerae normal  HENT: ear canals and TM's normal, nose and mouth without ulcers or lesions  NECK: no adenopathy, no asymmetry, masses, or scars and thyroid normal to palpation  RESP: lungs clear to auscultation - no rales, rhonchi or wheezes  CV: regular rate and rhythm, normal S1 S2, no S3 or S4, no murmur, click or rub, no peripheral edema and peripheral pulses strong  ABDOMEN: soft, nontender, no hepatosplenomegaly, no masses and bowel sounds normal  MS: no gross musculoskeletal defects noted, no edema, right trap tenderness  SKIN: no suspicious lesions or rashes  NEURO: Normal strength and tone, mentation intact and speech normal  PSYCH: mentation appears normal, affect normal/bright        ASSESSMENT/PLAN:       ICD-10-CM    1. Routine general medical examination at a health care facility  Z00.00       2. " Non-seasonal allergic rhinitis due to other allergic trigger  J30.89 Well controlled with fexofenadine daily in fall and winter      3. Recurrent major depressive disorder, in full remission (H)  F33.42 Well controlled with outside therapist and prescriber      4. LAZARO (generalized anxiety disorder)  F41.1 See above      5. Autism spectrum disorder  F84.0 See above      6. ADHD (attention deficit hyperactivity disorder), inattentive type  F90.0 See above      We discussed healthy lifestyle, nutrition, cardiovascular risk reduction, self care, safety, sunscreen, and timing of cancer screening.  Health maintenance screening and immunizations reviewed with the patient.  Follow up yearly for the annual physical.    Discussed with patient that he does not need any labs today based on his risk factors.  He has had cholesterol checked BMP and CBC checked in 2020 and all were normal  Patient has no acute concerns today    He is low risk for hepatitis C and HIV and therefore will not drop that lab today    Discussed follow-up in 1 year for annual physical or sooner if would like to address myofascial pain and right shoulder.          COUNSELING:    Reviewed preventive health counseling, as reflected in patient instructions       Regular exercise       Healthy diet/nutrition        Gregory Nolasco reports that Gregory Nolasco has never smoked. Gregory Nolasco has never used smokeless tobacco.            WESLEY Morales CNP  [facility]

## 2023-05-12 ENCOUNTER — VIRTUAL VISIT (OUTPATIENT)
Dept: FAMILY MEDICINE | Facility: CLINIC | Age: 48
End: 2023-05-12
Payer: COMMERCIAL

## 2023-05-12 DIAGNOSIS — R55 VASOVAGAL EPISODE: Primary | ICD-10-CM

## 2023-05-12 PROCEDURE — 99213 OFFICE O/P EST LOW 20 MIN: CPT | Mod: VID | Performed by: FAMILY MEDICINE

## 2023-05-12 NOTE — PROGRESS NOTES
"Assessment/Plan - Video Appointment    Mapidyt message after visit:    \"Luis Alberto Jenkins. I think you are experiencing vasovagal episodes.    Here is a link with additional information:  [email or web address]    The link describes \"syncope,\" which means passing out, but there is a spectrum of vasovagal episodes and many people don't actually pass out with these.    The best next steps are:  -be able to recognize when a vasovagal episode is beginning. Notify a friend or family member  -use techniques to calm yourself, such as deep breathing  -take a dose of hydroxyzine when the symptoms start. Hopefully this will help to prevent the episode from becoming severe    It was nice to meet you. Have a good weekend.\"    ----  Chief complaint: Neurologic Problem    Patient is concerned about a possible seizure.  He has experienced physical manifestations of panic throughout his life, but he recently had an episode that involved some different symptoms.    The most recent episode occurred when patient was traveling in [facility].  He finds travel to be stressful.    Patient describes episode as a feeling of nausea, urge to poop and warmth. This peaked after about 20 minutes.    Patient then developed twitching of thigh muscles and some jerking movements of legs. These specific symptoms are not typical for him.    Entire episode lasted about 45 minutes. After episode, pt felt tired. No LOC. No incontinence.    Denies hx of seizure, severe TBI or stroke. No family hx of seizure.    Pt had previously used prn lorazepam, but he reports that psychiatrist recently switched him to prn hydroxyzine. He also uses prn melatonin.    Denies recent illicit drug, cannabis or nicotine use. He consumes a few EtOH drinks per week.    Patient verbally consented to telehealth visit.  Location of patient: home. Location of provider: office.  Start time of conversation: 7:29am. End time of conversation: " 7:56am.  Billing based on complexity of encounter.

## 2023-05-13 PROBLEM — R55 VASOVAGAL EPISODE: Status: ACTIVE | Noted: 2023-05-13

## 2023-06-07 ENCOUNTER — TRANSFERRED RECORDS (OUTPATIENT)
Dept: HEALTH INFORMATION MANAGEMENT | Facility: CLINIC | Age: 48
End: 2023-06-07
Payer: COMMERCIAL

## 2024-01-20 ASSESSMENT — ENCOUNTER SYMPTOMS
SORE THROAT: 0
JOINT SWELLING: 0
HEMATURIA: 0
NERVOUS/ANXIOUS: 0
FREQUENCY: 0
DYSURIA: 0
CONSTIPATION: 0
EYE PAIN: 1
DIARRHEA: 0
FEVER: 0
COUGH: 0
CHILLS: 0
ARTHRALGIAS: 0
BREAST MASS: 0
PALPITATIONS: 0
HEARTBURN: 0
DIZZINESS: 0
ABDOMINAL PAIN: 0
SHORTNESS OF BREATH: 0
NAUSEA: 0
PARESTHESIAS: 0
MYALGIAS: 0
HEMATOCHEZIA: 0
WEAKNESS: 0
HEADACHES: 0

## 2024-01-25 ENCOUNTER — OFFICE VISIT (OUTPATIENT)
Dept: FAMILY MEDICINE | Facility: CLINIC | Age: 49
End: 2024-01-25
Payer: COMMERCIAL

## 2024-01-25 VITALS
WEIGHT: 180.5 LBS | DIASTOLIC BLOOD PRESSURE: 90 MMHG | HEIGHT: 74 IN | HEART RATE: 87 BPM | RESPIRATION RATE: 18 BRPM | BODY MASS INDEX: 23.16 KG/M2 | TEMPERATURE: 98.1 F | OXYGEN SATURATION: 97 % | SYSTOLIC BLOOD PRESSURE: 140 MMHG

## 2024-01-25 DIAGNOSIS — R68.89 LIGHT SENSITIVITY: ICD-10-CM

## 2024-01-25 DIAGNOSIS — Z00.00 VISIT FOR PREVENTIVE HEALTH EXAMINATION: Primary | ICD-10-CM

## 2024-01-25 DIAGNOSIS — R03.0 ELEVATED BP WITHOUT DIAGNOSIS OF HYPERTENSION: ICD-10-CM

## 2024-01-25 DIAGNOSIS — R59.0 LEFT CERVICAL LYMPHADENOPATHY: ICD-10-CM

## 2024-01-25 PROCEDURE — 99396 PREV VISIT EST AGE 40-64: CPT | Performed by: FAMILY MEDICINE

## 2024-01-25 PROCEDURE — 99212 OFFICE O/P EST SF 10 MIN: CPT | Mod: 25 | Performed by: FAMILY MEDICINE

## 2024-01-25 RX ORDER — HYDROXYZINE HYDROCHLORIDE 10 MG/1
TABLET, FILM COATED ORAL
COMMUNITY
Start: 2023-05-15 | End: 2024-09-20

## 2024-01-25 ASSESSMENT — ENCOUNTER SYMPTOMS
SORE THROAT: 0
HEADACHES: 0
WEAKNESS: 0
ARTHRALGIAS: 0
HEMATURIA: 0
FREQUENCY: 0
CONSTIPATION: 0
SHORTNESS OF BREATH: 0
JOINT SWELLING: 0
MYALGIAS: 0
PALPITATIONS: 0
DIARRHEA: 0
COUGH: 0
DYSURIA: 0
EYE PAIN: 1
CHILLS: 0
NAUSEA: 0
NERVOUS/ANXIOUS: 0
FEVER: 0
DIZZINESS: 0
ABDOMINAL PAIN: 0

## 2024-01-25 ASSESSMENT — PATIENT HEALTH QUESTIONNAIRE - PHQ9
10. IF YOU CHECKED OFF ANY PROBLEMS, HOW DIFFICULT HAVE THESE PROBLEMS MADE IT FOR YOU TO DO YOUR WORK, TAKE CARE OF THINGS AT HOME, OR GET ALONG WITH OTHER PEOPLE: VERY DIFFICULT
SUM OF ALL RESPONSES TO PHQ QUESTIONS 1-9: 6
SUM OF ALL RESPONSES TO PHQ QUESTIONS 1-9: 6

## 2024-01-25 ASSESSMENT — PAIN SCALES - GENERAL: PAINLEVEL: NO PAIN (0)

## 2024-01-25 NOTE — PROGRESS NOTES
"Preventive Care Visit  [facility]  Reese Beltran MD, Family Medicine  Jan 25, 2024  {Provider  Link to SmartSet :791614}    SUBJECTIVE:   Gregory is a 49 year old, presenting for the following:  Wellness Visit        1/25/2024     8:23 AM   Additional Questions   Roomed by Steve BUCHANAN     Are you in the first 12 months of your Medicare coverage?  No    Healthy Habits:     Getting at least 3 servings of Calcium per day:  Yes    Bi-annual eye exam:  Yes    Dental care twice a year:  Yes    Sleep apnea or symptoms of sleep apnea:  None    Diet:  Vegetarian/vegan    Frequency of exercise:  6-7 days/week    Duration of exercise:  30-45 minutes    Taking medications regularly:  Yes    Medication side effects:  None    Additional concerns today:  Yes    Today's PHQ-9 Score:       1/25/2024     8:16 AM   PHQ-9 SCORE   PHQ-9 Total Score MyChart 6 (Mild depression)   PHQ-9 Total Score 6     {ALERT  Recent PHQ-9 score indicates suicidal ideations :319753}{Provider Documentation  Link to C-SSRS ([facility]) Flowsheet :706093}    Have you ever done Advance Care Planning? (For example, a Health Directive, POLST, or a discussion with a medical provider or your loved ones about your wishes): No, advance care planning information given to patient to review.  Patient plans to discuss their wishes with loved ones or provider.      {Hearing Test Done (Optional):242520}   Fall risk     {If any of the above assessments are answered yes, consider ordering appropriate referrals (Optional):025162}  Cognitive Screening   1) Repeat 3 items (Leader, Season, Table)  {Get patient's attention, then say, \"I am going to say three words that I want you to remember now and later.  The words are Leader, Season, and Table.  Please say them for me now.\"  If pt. unable after 3 tries, go to next item}  2) Clock draw: {Say the following phrases in order: \"Please draw a clock.  Start by drawing a large [place].  " "Put all the numbers in the [place] and set the hands to show 11:10 (10 past 11).\"  If pt cannot complete in 3 minutes, stop and ask for recall items.  \"NORMAL\" test = all twelve numbers in correct location, and clock hands correctly designating 11:10}NORMAL  3) 3 item recall: {Say: \"What were the three words I asked you to remember?\"}Recalls 3 objects  Results: 3 items recalled: COGNITIVE IMPAIRMENT LESS LIKELY    Mini-CogTM Copyright S Adelso. Licensed by the author for use in [facility]; reprinted with permission (socha@[facility]). All rights reserved.      Do you have sleep apnea, excessive snoring or daytime drowsiness? : no    Reviewed and updated as needed this visit by clinical staff   Tobacco  Allergies  Meds              Reviewed and updated as needed this visit by Provider                  Social History     Tobacco Use    Smoking status: Never    Smokeless tobacco: Never   Substance Use Topics    Alcohol use: Not on file     {Rooming staff  Click this link to complete the Prescreen if response below is not for today's visit  Alcohol Use Prescreen >3 drinks/day or > 7 drinks/week.  If the prescreen question answer is YES, complete the full AUDIT  :201205}        1/20/2024    10:23 AM   Alcohol Use   Prescreen: >3 drinks/day or >7 drinks/week? No          No data to display              Do you have a current opioid prescription? { :565679}  Do you use any other controlled substances or medications that are not prescribed by a provider? {Substance Use :526879}  {Provider  If there are gaps in the social history shown above, please follow the link and refresh the note Link to Social and Substance History :580501}    {Outside tests to abstract? (Optional):938684}    {additional problems to add (Optional):738111}    Current providers sharing in care for this patient include: {Rooming staff:  Please update Care Team from storyboard, refresh this note and then delete this statement}  Patient Care " Team:  Reese Beltran MD as PCP - General  Angelique Fernandez APRN CNP as Assigned PCP    The following health maintenance items are reviewed in Epic and correct as of today:  Health Maintenance   Topic Date Due    HEPATITIS B IMMUNIZATION (1 of 3 - 3-dose series) Never done    HIV SCREENING  Never done    HEPATITIS C SCREENING  Never done    YEARLY PREVENTIVE VISIT  01/25/2024    ANNUAL REVIEW OF HM ORDERS  01/25/2024    PHQ-9  07/25/2024    LIPID  12/10/2025    COLORECTAL CANCER SCREENING  02/02/2027    ADVANCE CARE PLANNING  01/25/2028    DTAP/TDAP/TD IMMUNIZATION (3 - Td or Tdap) 02/21/2028    DEPRESSION ACTION PLAN  Completed    INFLUENZA VACCINE  Completed    COVID-19 Vaccine  Completed    Pneumococcal Vaccine: Pediatrics (0 to 5 Years) and At-Risk Patients (6 to 64 Years)  Aged Out    IPV IMMUNIZATION  Aged Out    HPV IMMUNIZATION  Aged Out    MENINGITIS IMMUNIZATION  Aged Out    RSV MONOCLONAL ANTIBODY  Aged Out     {Chronicprobdata (optional):017787}  {Decision Support (Optional):941473}      Review of Systems   Constitutional:  Negative for chills and fever.   HENT:  Negative for congestion, ear pain, hearing loss and sore throat.    Eyes:  Positive for pain. Negative for visual disturbance.   Respiratory:  Negative for cough and shortness of breath.    Cardiovascular:  Negative for chest pain and palpitations.   Gastrointestinal:  Negative for abdominal pain, constipation, diarrhea and nausea.   Genitourinary:  Negative for dysuria, frequency, genital sores, hematuria, pelvic pain, penile discharge, urgency, vaginal bleeding and vaginal discharge.   Musculoskeletal:  Negative for arthralgias, joint swelling and myalgias.   Skin:  Negative for rash.   Neurological:  Negative for dizziness, weakness and headaches.   Psychiatric/Behavioral:  The patient is not nervous/anxious.       {ROS Picklists (Optional):908858}    OBJECTIVE:   BP (!) 150/83 (BP Location: Right arm, Patient Position: Sitting, Cuff Size:  "Adult Regular)   Pulse 87   Temp 98.1  F (36.7  C) (Temporal)   Resp 18   Ht 1.886 m (6' 2.25\")   Wt 81.9 kg (180 lb 8 oz)   SpO2 97%   BMI 23.02 kg/m     Estimated body mass index is 23.02 kg/m  as calculated from the following:    Height as of this encounter: 1.886 m (6' 2.25\").    Weight as of this encounter: 81.9 kg (180 lb 8 oz).  Physical Exam  {Exam List (Optional):828999}    {Diagnostic Test Results (Optional):573418}    ASSESSMENT / PLAN:   {Diag Picklist:609723}    {Patient advised of split billing (Optional):914425}    Depression Screening Follow Up        2024     8:16 AM   PHQ   PHQ-9 Total Score 6   Q9: Thoughts of better off dead/self-harm past 2 weeks Several days   F/U: Thoughts of suicide or self-harm Yes   F/U: Self harm-plan No   F/U: Self-harm action No   F/U: Safety concerns No     {Last PHQ9 Response (Optional):119788}    { Link to C-SSRS ([facility]) Flowsheet :890680}  {C-SSRS results from today (Required):386047}      Follow Up  {After C-SRSS completed-Select to see recommended follow up based on results (Optional):624124}  Follow Up Actions Taken  {ACTIONS TAKEN:510606::\"Crisis resource information provided in the After Visit Summary\"}    Discussed the following ways the patient can remain in a safe environment:  {SAFE ENVIRONMENT:092486}    Counseling  {Medicare Counselin}        Gregory Nolasco reports that Gregory Nolasco has never smoked. Gregory Nolasco has never used smokeless tobacco.      Appropriate preventive services were discussed with this patient, including applicable screening as appropriate for fall prevention, nutrition, physical activity, Tobacco-use cessation, weight loss and cognition.  Checklist reviewing preventive services available has been given to the patient.    Reviewed patients plan of care and provided an AVS. The {CarePlan:808812} for Gregory meets the Care Plan requirement. This Care Plan has been established and reviewed with " the {PATIENT, FAMILY MEMBER, CAREGIVER:788574}.    {Counseling Resources  [place] Preventive Services Task Force  Cholesterol Screening  Health diet/nutrition  Pooled Cohorts Equation Calculator  USDA's MyPlate  ASA Prophylaxis  Lung CA Screening  Osteoporosis prevention/bone health :454601}  {Prostate Cancer Screening  Consider for men 55-69 per guidance from USPSTF :085845}    Signed Electronically by: Reese Beltran MD    Identified Health Risks  {Medicare required documentation of substance and opioid use disorders screening :571030}

## 2024-01-25 NOTE — PROGRESS NOTES
Assessment/Plan.    Preventive.  See below orders for screening tests and immunizations  -Patient reports that he received usual childhood vaccines    Red papules on left side of forehead.  Suspect acne-like lesion.  Given that these typically self-resolve, I do not feel that further testing or treatment is indicated.    Sound sensitivity.  Reassuring ear exam today.  Minimal functional impairment.  Suspect sensory sensitivity issue, possibly associated with autism.  I do not feel that further testing or treatment is indicated.    Light sensitivity.  Patient reports recent eye exam was reassuring.  Suspect sensory sensitivity issue.  Functions well with tinted glasses.  I do not feel that further testing or treatment is indicated.    Prominent left cervical node.  Suspect this is related to reported COVID infection within the past few months.  Trend.    Elevated blood pressure.  Improvement upon manual recheck, though still in borderline-high range.  Given that patient reports reassuring recent blood pressures at outside psychiatry visits, I do not feel that medication is indicated.  Will trend.    F/u every 1 to 2 years for preventive visit.      ----  Chief complaint: Wellness Visit    Social History     Social History Narrative    -Grew up in [place]    -Lives with wife    -No kids    -Not working currently    -Enjoys [employer]        Updated 1/25/2024     Patient has been advised of split billing: yes.    Light sensitivity, caused HA  Resolves with yellow tint glasses  Glasses for vision since kid  Last eye check 1 year ago, reassuring    Sound sensitivity - volume, certain types of sounds  Responds well to ear plugs  Lifelong issue  No hearing concerns    Red spots on left forehead  Intermittent - possible triggers include stress, exercise  Years  Tried Clearasil  No personal hx of skin cancer  Grandmother had skin cancer, sister has rosacea    BP up  No caffeine today    Denies STI  "concerns    Exam  BP (!) 140/90 (BP Location: Right arm)   Pulse 87   Temp 98.1  F (36.7  C) (Temporal)   Resp 18   Ht 1.886 m (6' 2.25\")   Wt 81.9 kg (180 lb 8 oz)   SpO2 97%   BMI 23.02 kg/m      oropharynx without abnormal masses  Prominent left cervical node  lung fields CTAB  heart with regular rate and rhythm, no murmurs    2 small red papules on the left side of forehead.  On dermoscopy, each contains a central keratin plug with some dilated vessels nearby  "

## 2024-04-04 ENCOUNTER — E-VISIT (OUTPATIENT)
Dept: FAMILY MEDICINE | Facility: CLINIC | Age: 49
End: 2024-04-04
Payer: COMMERCIAL

## 2024-04-04 DIAGNOSIS — Z13.29 SCREENING FOR THYROID DISORDER: Primary | ICD-10-CM

## 2024-04-04 PROCEDURE — 99207 PR NO BILLABLE SERVICE THIS VISIT: CPT | Performed by: FAMILY MEDICINE

## 2024-04-04 RX ORDER — BUSPIRONE HYDROCHLORIDE 5 MG/1
TABLET ORAL
COMMUNITY
Start: 2024-04-04 | End: 2024-09-20

## 2024-04-04 RX ORDER — PROPRANOLOL HYDROCHLORIDE 10 MG/1
TABLET ORAL
COMMUNITY
Start: 2024-03-28 | End: 2024-09-20

## 2024-04-04 ASSESSMENT — ANXIETY QUESTIONNAIRES
GAD7 TOTAL SCORE: 21
GAD7 TOTAL SCORE: 21
8. IF YOU CHECKED OFF ANY PROBLEMS, HOW DIFFICULT HAVE THESE MADE IT FOR YOU TO DO YOUR WORK, TAKE CARE OF THINGS AT HOME, OR GET ALONG WITH OTHER PEOPLE?: EXTREMELY DIFFICULT
7. FEELING AFRAID AS IF SOMETHING AWFUL MIGHT HAPPEN: NEARLY EVERY DAY
7. FEELING AFRAID AS IF SOMETHING AWFUL MIGHT HAPPEN: NEARLY EVERY DAY
5. BEING SO RESTLESS THAT IT IS HARD TO SIT STILL: NEARLY EVERY DAY
1. FEELING NERVOUS, ANXIOUS, OR ON EDGE: NEARLY EVERY DAY
4. TROUBLE RELAXING: NEARLY EVERY DAY
3. WORRYING TOO MUCH ABOUT DIFFERENT THINGS: NEARLY EVERY DAY
GAD7 TOTAL SCORE: 21
2. NOT BEING ABLE TO STOP OR CONTROL WORRYING: NEARLY EVERY DAY
6. BECOMING EASILY ANNOYED OR IRRITABLE: NEARLY EVERY DAY

## 2024-04-04 ASSESSMENT — PATIENT HEALTH QUESTIONNAIRE - PHQ9
10. IF YOU CHECKED OFF ANY PROBLEMS, HOW DIFFICULT HAVE THESE PROBLEMS MADE IT FOR YOU TO DO YOUR WORK, TAKE CARE OF THINGS AT HOME, OR GET ALONG WITH OTHER PEOPLE: EXTREMELY DIFFICULT
SUM OF ALL RESPONSES TO PHQ QUESTIONS 1-9: 19
SUM OF ALL RESPONSES TO PHQ QUESTIONS 1-9: 19

## 2024-04-05 ASSESSMENT — PATIENT HEALTH QUESTIONNAIRE - PHQ9: SUM OF ALL RESPONSES TO PHQ QUESTIONS 1-9: 19

## 2024-04-05 ASSESSMENT — ANXIETY QUESTIONNAIRES: GAD7 TOTAL SCORE: 21

## 2024-04-06 ENCOUNTER — LAB (OUTPATIENT)
Dept: LAB | Facility: CLINIC | Age: 49
End: 2024-04-06
Payer: COMMERCIAL

## 2024-04-06 DIAGNOSIS — Z13.29 SCREENING FOR THYROID DISORDER: ICD-10-CM

## 2024-04-06 LAB — TSH SERPL DL<=0.005 MIU/L-ACNC: 3.14 UIU/ML (ref 0.3–4.2)

## 2024-04-06 PROCEDURE — 36415 COLL VENOUS BLD VENIPUNCTURE: CPT

## 2024-04-06 PROCEDURE — 84443 ASSAY THYROID STIM HORMONE: CPT

## 2024-04-10 ENCOUNTER — OFFICE VISIT (OUTPATIENT)
Dept: FAMILY MEDICINE | Facility: CLINIC | Age: 49
End: 2024-04-10
Payer: COMMERCIAL

## 2024-04-10 VITALS
SYSTOLIC BLOOD PRESSURE: 123 MMHG | RESPIRATION RATE: 17 BRPM | HEART RATE: 75 BPM | TEMPERATURE: 97.9 F | DIASTOLIC BLOOD PRESSURE: 86 MMHG | HEIGHT: 74 IN | WEIGHT: 174.5 LBS | OXYGEN SATURATION: 98 % | BODY MASS INDEX: 22.39 KG/M2

## 2024-04-10 DIAGNOSIS — Z11.59 NEED FOR HEPATITIS C SCREENING TEST: ICD-10-CM

## 2024-04-10 DIAGNOSIS — R11.0 NAUSEA: ICD-10-CM

## 2024-04-10 DIAGNOSIS — R53.83 OTHER FATIGUE: ICD-10-CM

## 2024-04-10 DIAGNOSIS — F41.1 GAD (GENERALIZED ANXIETY DISORDER): ICD-10-CM

## 2024-04-10 DIAGNOSIS — F33.42 RECURRENT MAJOR DEPRESSIVE DISORDER, IN FULL REMISSION (H): ICD-10-CM

## 2024-04-10 DIAGNOSIS — Z11.4 SCREENING FOR HIV (HUMAN IMMUNODEFICIENCY VIRUS): Primary | ICD-10-CM

## 2024-04-10 PROCEDURE — 99214 OFFICE O/P EST MOD 30 MIN: CPT

## 2024-04-10 RX ORDER — HYDROXYZINE PAMOATE 25 MG/1
25 CAPSULE ORAL 3 TIMES DAILY PRN
Qty: 60 CAPSULE | Refills: 1 | Status: SHIPPED | OUTPATIENT
Start: 2024-04-10 | End: 2024-09-20

## 2024-04-10 RX ORDER — ONDANSETRON 4 MG/1
4 TABLET, ORALLY DISINTEGRATING ORAL EVERY 8 HOURS PRN
Qty: 20 TABLET | Refills: 0 | Status: SHIPPED | OUTPATIENT
Start: 2024-04-10 | End: 2024-09-20

## 2024-04-10 RX ORDER — DULOXETIN HYDROCHLORIDE 30 MG/1
30 CAPSULE, DELAYED RELEASE ORAL DAILY
Qty: 30 CAPSULE | Refills: 1 | Status: SHIPPED | OUTPATIENT
Start: 2024-04-10 | End: 2024-09-20

## 2024-04-10 ASSESSMENT — ANXIETY QUESTIONNAIRES
IF YOU CHECKED OFF ANY PROBLEMS ON THIS QUESTIONNAIRE, HOW DIFFICULT HAVE THESE PROBLEMS MADE IT FOR YOU TO DO YOUR WORK, TAKE CARE OF THINGS AT HOME, OR GET ALONG WITH OTHER PEOPLE: EXTREMELY DIFFICULT
GAD7 TOTAL SCORE: 21
6. BECOMING EASILY ANNOYED OR IRRITABLE: NEARLY EVERY DAY
GAD7 TOTAL SCORE: 21
7. FEELING AFRAID AS IF SOMETHING AWFUL MIGHT HAPPEN: NEARLY EVERY DAY
5. BEING SO RESTLESS THAT IT IS HARD TO SIT STILL: NEARLY EVERY DAY
8. IF YOU CHECKED OFF ANY PROBLEMS, HOW DIFFICULT HAVE THESE MADE IT FOR YOU TO DO YOUR WORK, TAKE CARE OF THINGS AT HOME, OR GET ALONG WITH OTHER PEOPLE?: EXTREMELY DIFFICULT
7. FEELING AFRAID AS IF SOMETHING AWFUL MIGHT HAPPEN: NEARLY EVERY DAY
2. NOT BEING ABLE TO STOP OR CONTROL WORRYING: NEARLY EVERY DAY
1. FEELING NERVOUS, ANXIOUS, OR ON EDGE: NEARLY EVERY DAY
4. TROUBLE RELAXING: NEARLY EVERY DAY
3. WORRYING TOO MUCH ABOUT DIFFERENT THINGS: NEARLY EVERY DAY

## 2024-04-10 ASSESSMENT — PAIN SCALES - GENERAL: PAINLEVEL: NO PAIN (0)

## 2024-04-10 ASSESSMENT — ENCOUNTER SYMPTOMS: NERVOUS/ANXIOUS: 1

## 2024-04-10 NOTE — PROGRESS NOTES
Assessment & Plan     Other fatigue  - Hemoglobin A1c; Future  - Comprehensive metabolic panel (BMP + Alb, Alk Phos, ALT, AST, Total. Bili, TP); Future  - Vitamin D Deficiency; Future  - CBC with platelets and differential; Future  - Nutritional Supplements (ENSURE COMPLETE) LIQD; Take 1 Package by mouth daily  Patient has had a 6 pound weight loss since January.  I did prescribe him with a small amount of Ensure to help  prevent him from losing weight.  Will do medical workup for his fatigue including analysis for diabetes, hypoglycemia, low albumin, anemias.      Recurrent major depressive disorder, in full remission (H24)  - DULoxetine (CYMBALTA) 30 MG capsule; Take 1 capsule (30 mg) by mouth daily  Uncontrolled chronic.  Passive suicidal ideation at the moment.    LAZARO (generalized anxiety disorder)  - hydrOXYzine roddy (VISTARIL) 25 MG capsule; Take 1 capsule (25 mg) by mouth 3 times daily as needed for anxiety  - DULoxetine (CYMBALTA) 30 MG capsule; Take 1 capsule (30 mg) by mouth daily  Uncontrolled chronic.  Patient has had issues with worsening anxiety over the past month.  Does follow-up with a mental health nurse practitioner and has an appointment in 2 weeks.  He has been having every other day panic attacks that seem to be around 4 to 6 PM and may last several hours, which seem to improve with food.  He reports that daylight savings threw off his previous eating schedule and that he used to do snack breaks which were helpful for overall management of his physical and mental health.  I think it would be worth restarting one of his daily medications given that he has such frequent and strong anxiety.  He reports that Cymbalta previously worked well for him, although it was difficult to get off of.  We will to start him at a low dose today.  I will increase his hydroxyzine dose which he can try from 3-3:30 to see if this is helpful.  We also discussed that it is important for him to reestablish his neck  "breaks and work with his therapist on scheduling snack breaks to help avoid the flare of these anxiety attacks.      Nausea  - ondansetron (ZOFRAN ODT) 4 MG ODT tab; Take 1 tablet (4 mg) by mouth every 8 hours as needed for nausea  Small prescription for Zofran prescribed for patient which may help with his physical symptoms.  Advised him not to take this every day.      River Jenkins is a 49 year old, presenting for the following health issues:  Anxiety      4/10/2024     9:51 AM   Additional Questions   Roomed by Steve BUCHANAN         4/10/2024     9:51 AM   Patient Reported Additional Medications   Patient reports taking the following new medications Flonase   Past month, has had flaring of anxiety: panic attacks that last for hours.   Has not eaten well over the past month. Calorie raise, he just feels very hungary. He noticed his anxiety would improve with a banana.  Went to the ER last night in the hospital and felt. Past weeek or so, patient will feel fine in the morning, and then [id number], he will feel. Feels \"generally terrible\" around that time: bad cramps, sensation of palpitations, someitmes sensation of hot/cold in different places, sometimes has some dizziness, very tired over the past few weeks. Sleeping later, but feeling okay. Has had a 6 pound weight losss, but reports eating crummy in the holidays.    Has been working with mental health practitioner.  Has a follow-up with nurse practitioner in 2 weeks.     The change with daylight savings time threw off his eating schedule and started skipping his snack breaks. Has had depression in combination with anxiety too.     Used zoloft a long time ago-   Escitalopram not as helpful.  Cymbalta- very good.  Wellbutrin- was just fine.     Anxiety    History of Present Illness       Mental Health Follow-up:  Patient presents to follow-up on Depression & Anxiety.Patient's depression since last visit has been:  Worse  The patient is having other symptoms " "associated with depression.  Patient's anxiety since last visit has been:  Worse  The patient is having other symptoms associated with anxiety.  Any significant life events: No  Patient is feeling anxious or having panic attacks.  Patient has no concerns about alcohol or drug use.    Hypertension: Gregory presents for follow up of hypertension.  Gregory does not check blood pressure  regularly outside of the clinic. Outside blood pressures have been over 140/90. Gregory does not follow a low salt diet.     Gregory eats 4 or more servings of fruits and vegetables daily.Gregory consumes 0 sweetened beverage(s) daily.Gregory exercises with enough effort to increase Gregory's heart rate 30 to 60 minutes per day.  Gregory exercises with enough effort to increase Gregory's heart rate 5 days per week.   Gregory is taking medications regularly.         Objective    /86 (BP Location: Right arm, Patient Position: Sitting, Cuff Size: Adult Regular)   Pulse 75   Temp 97.9  F (36.6  C) (Temporal)   Resp 17   Ht 1.88 m (6' 2.02\")   Wt 79.2 kg (174 lb 8 oz)   SpO2 98%   BMI 22.39 kg/m    Body mass index is 22.39 kg/m .  Physical Exam   GENERAL: alert and no distress  PSYCH: anxious affect            Signed Electronically by: Melvin Ward NP    "

## 2024-04-11 ENCOUNTER — NURSE TRIAGE (OUTPATIENT)
Dept: NURSING | Facility: CLINIC | Age: 49
End: 2024-04-11
Payer: COMMERCIAL

## 2024-04-11 NOTE — TELEPHONE ENCOUNTER
Nurse Triage SBAR    Is this a 2nd Level Triage? NO    Situation:   Anxiety, woke up hot and shaking    Background:   Pt was started on duloxetine yesterday.  He is currently taking hydroxyzine as needed.    Assessment:   Pt woke up hot and shaking.  He has very mild dizziness.  His symptoms improved and went away after eating. He is able to fall asleep and wakes up 2 hours later with the same symptoms.    Protocol Recommended Disposition:   See PCP Within 3 Days    Recommendation:   Pt is concerned about his sugars and possible serotonin syndrome.  Please advise.  He was just seen in clinic yesterday.  He can be reached at [phone].     Mirella Grant, RN, BSN Nurse Triage Advisor 4/11/2024 3:16 AM     Routed to provider    Does the patient meet one of the following criteria for ADS visit consideration? 16+ years old, with an [facility] PCP     TIP  Providers, please consider if this condition is appropriate for management at one of our Acute and Diagnostic Services sites.     If patient is a good candidate, please use dotphrase <dot>triageresponse and select Refer to ADS to document.      Reason for Disposition   [1] Started on anti-anxiety medication AND [2] no relief    Additional Information   Negative: SEVERE difficulty breathing (e.g., struggling for each breath, speaks in single words)   Negative: Bluish (or gray) lips or face now   Negative: Difficult to awaken or acting confused (e.g., disoriented, slurred speech)   Negative: Violent behavior, or threatening to physically hurt or kill someone   Negative: Sounds like a life-threatening emergency to the triager   Negative: [1] Difficulty breathing AND [2] persists > 10 minutes AND [3] not relieved by reassurance provided by triager   Negative: [1] Lightheadedness or dizziness AND [2] persists > 10 minutes AND [3] not relieved by reassurance provided by triager   Negative: [1] SEVERE anxiety (e.g., extremely anxious with intense emotional symptoms such as feeling  of unreality, urge to flee, unable to calm down; unable to cope or function) AND [2] not better after 10 minutes of reassurance and Care Advice   Negative: [1] Panic attack symptoms (e.g., sudden onset of intense fear and symptoms such as dizziness, feeling of impending doom or fear of dying, hyperventilation, numbness or tingling, sweating, trembling) AND [2] has not been evaluated for this by doctor (or NP/PA)   Negative: [1] Panic attack symptoms (diagnosed in the past) AND [2] not better with usual treatment, reassurance, or Care Advice   Negative: [1] Alcohol or drug use, known or suspected AND [2] feeling very shaky (i.e., visible tremors of hands)   Negative: Patient sounds very sick or weak to the triager   Negative: Patient sounds very upset or troubled to the triager   Negative: MODERATE anxiety (e.g., persistent or frequent anxiety symptoms; interferes with sleep, school, or work)   Negative: [1] Anxiety symptoms AND [2] has not been evaluated for this by doctor (or NP/PA)    Protocols used: Anxiety and Panic Attack-A-

## 2024-04-12 ENCOUNTER — LAB (OUTPATIENT)
Dept: LAB | Facility: CLINIC | Age: 49
End: 2024-04-12
Payer: COMMERCIAL

## 2024-04-12 DIAGNOSIS — R53.83 OTHER FATIGUE: ICD-10-CM

## 2024-04-12 LAB
ALBUMIN SERPL BCG-MCNC: 4.6 G/DL (ref 3.5–5.2)
ALP SERPL-CCNC: 80 U/L (ref 40–150)
ALT SERPL W P-5'-P-CCNC: 16 U/L (ref 0–70)
ANION GAP SERPL CALCULATED.3IONS-SCNC: 8 MMOL/L (ref 7–15)
AST SERPL W P-5'-P-CCNC: 17 U/L (ref 0–45)
BASOPHILS # BLD AUTO: 0 10E3/UL (ref 0–0.2)
BASOPHILS NFR BLD AUTO: 1 %
BILIRUB SERPL-MCNC: 0.4 MG/DL
BUN SERPL-MCNC: 15 MG/DL (ref 6–20)
CALCIUM SERPL-MCNC: 9.6 MG/DL (ref 8.6–10)
CHLORIDE SERPL-SCNC: 101 MMOL/L (ref 98–107)
CREAT SERPL-MCNC: 1.1 MG/DL (ref 0.51–1.17)
DEPRECATED HCO3 PLAS-SCNC: 31 MMOL/L (ref 22–29)
EGFRCR SERPLBLD CKD-EPI 2021: 83 ML/MIN/1.73M2
EOSINOPHIL # BLD AUTO: 0.1 10E3/UL (ref 0–0.7)
EOSINOPHIL NFR BLD AUTO: 1 %
ERYTHROCYTE [DISTWIDTH] IN BLOOD BY AUTOMATED COUNT: 12.5 % (ref 10–15)
GLUCOSE SERPL-MCNC: 93 MG/DL (ref 70–99)
HBA1C MFR BLD: 5.3 % (ref 0–5.6)
HCT VFR BLD AUTO: 47.4 % (ref 35–53)
HGB BLD-MCNC: 16 G/DL (ref 11.7–17.7)
IMM GRANULOCYTES # BLD: 0 10E3/UL
IMM GRANULOCYTES NFR BLD: 0 %
LYMPHOCYTES # BLD AUTO: 1.9 10E3/UL (ref 0.8–5.3)
LYMPHOCYTES NFR BLD AUTO: 24 %
MCH RBC QN AUTO: 30 PG (ref 26.5–33)
MCHC RBC AUTO-ENTMCNC: 33.8 G/DL (ref 31.5–36.5)
MCV RBC AUTO: 89 FL (ref 78–100)
MONOCYTES # BLD AUTO: 0.7 10E3/UL (ref 0–1.3)
MONOCYTES NFR BLD AUTO: 9 %
NEUTROPHILS # BLD AUTO: 5.1 10E3/UL (ref 1.6–8.3)
NEUTROPHILS NFR BLD AUTO: 65 %
PLATELET # BLD AUTO: 210 10E3/UL (ref 150–450)
POTASSIUM SERPL-SCNC: 4.7 MMOL/L (ref 3.4–5.3)
PROT SERPL-MCNC: 7.2 G/DL (ref 6.4–8.3)
RBC # BLD AUTO: 5.33 10E6/UL (ref 3.8–5.9)
SODIUM SERPL-SCNC: 140 MMOL/L (ref 135–145)
VIT D+METAB SERPL-MCNC: 55 NG/ML (ref 20–50)
WBC # BLD AUTO: 7.8 10E3/UL (ref 4–11)

## 2024-04-12 PROCEDURE — 36415 COLL VENOUS BLD VENIPUNCTURE: CPT

## 2024-04-12 PROCEDURE — 85025 COMPLETE CBC W/AUTO DIFF WBC: CPT

## 2024-04-12 PROCEDURE — 82306 VITAMIN D 25 HYDROXY: CPT

## 2024-04-12 PROCEDURE — 83036 HEMOGLOBIN GLYCOSYLATED A1C: CPT

## 2024-04-12 PROCEDURE — 80053 COMPREHEN METABOLIC PANEL: CPT

## 2024-05-16 ENCOUNTER — TRANSFERRED RECORDS (OUTPATIENT)
Dept: HEALTH INFORMATION MANAGEMENT | Facility: CLINIC | Age: 49
End: 2024-05-16

## 2024-07-21 ENCOUNTER — E-VISIT (OUTPATIENT)
Dept: FAMILY MEDICINE | Facility: CLINIC | Age: 49
End: 2024-07-21
Payer: COMMERCIAL

## 2024-07-21 DIAGNOSIS — J30.2 SEASONAL ALLERGIC RHINITIS, UNSPECIFIED TRIGGER: Primary | ICD-10-CM

## 2024-07-21 PROCEDURE — 99421 OL DIG E/M SVC 5-10 MIN: CPT | Performed by: FAMILY MEDICINE

## 2024-07-22 RX ORDER — CETIRIZINE HYDROCHLORIDE 10 MG/1
10 TABLET ORAL DAILY PRN
Status: SHIPPED
Start: 2024-07-22 | End: 2024-09-20

## 2024-09-20 ENCOUNTER — OFFICE VISIT (OUTPATIENT)
Dept: ALLERGY | Facility: CLINIC | Age: 49
End: 2024-09-20
Payer: COMMERCIAL

## 2024-09-20 VITALS
BODY MASS INDEX: 22.51 KG/M2 | OXYGEN SATURATION: 97 % | WEIGHT: 175.4 LBS | HEART RATE: 68 BPM | RESPIRATION RATE: 18 BRPM

## 2024-09-20 DIAGNOSIS — J30.89 ALLERGIC RHINITIS DUE TO DUST MITE: Primary | ICD-10-CM

## 2024-09-20 DIAGNOSIS — J30.89 OTHER ALLERGIC RHINITIS: ICD-10-CM

## 2024-09-20 PROCEDURE — 86003 ALLG SPEC IGE CRUDE XTRC EA: CPT | Performed by: ALLERGY & IMMUNOLOGY

## 2024-09-20 PROCEDURE — 99203 OFFICE O/P NEW LOW 30 MIN: CPT | Performed by: ALLERGY & IMMUNOLOGY

## 2024-09-20 NOTE — LETTER
9/20/2024      Gregory Nolasco  [address] [place] [address]      Dear Colleague,    Thank you for referring your patient, Gregory Nolasco, to the [facility]. Please see a copy of my visit note below.          Subjective  Gregory is a 49 year old, presenting for the following health issues:  Allergy Consult (Concern for environmental allergies)    HPI     Chief complaint: Environmental allergies    History of present illness: This is a pleasant 49-year-old patient here today to discuss environmental allergies.  States that patient was skin tested this summer and positive to dust mites.  I do not have this record to review.  Would like to know if the patient is allergic to any other allergens.  Symptoms consist of nasal congestion pressure in the face and some soreness of the face.  Had been using Allegra which helped but did not completely resolve the nasal symptoms.  Flonase caused some bloody noses.  No asthma.  No cough, wheeze or shortness of breath currently.  Does note that the left ear feels plugged    Past medical history: Per the record history of depression and anxiety, ADHD    Social history: Works from home, does have a cat, has dust mite covers on bedding          Objective   Pulse 68   Resp 18   Wt 79.6 kg (175 lb 6.4 oz)   SpO2 97%   BMI 22.51 kg/m    Body mass index is 22.51 kg/m .  Physical Exam     Gen: Pleasant individual not in acute distress  HEENT: Eyes no erythema of the bulbar or palpebral conjunctiva, no edema. Ears: TMs well visualized, no effusions. Nose: No congestion, mucosa normal. Mouth: Throat clear, no lip or tongue edema.   Respiratory: Clear to auscultation bilaterally, no adventitious breath sounds  Skin: No rashes or lesions  Psych: Alert and oriented times 3    Impression report and plan:  1.  Allergic rhinitis to dust mite    Patient is wondering if we are missing other allergens.  Recommend specific IgE testing to the remaining  environmental allergens.  Reviewed environmental control regarding dust mite.  Discussed options including adding Astelin nasal spray.  Patient notes significant fatigue so recommended avoiding Zyrtec.  Suggested either Odactra or allergy immunotherapy but would need to obtain  outside records.    Signed Electronically by: Lizzette ROD MD      Again, thank you for allowing me to participate in the care of your patient.        Sincerely,        Lizzette ROD MD

## 2024-09-20 NOTE — PATIENT INSTRUCTIONS
Check lab test for environmental     Odactra --sublingual dust mite    Dust mite allergy shots    Astelin 2 sprays each nostril twice daily as needed (Astepro--OTC)

## 2024-09-20 NOTE — PROGRESS NOTES
River Jenkins is a 49 year old, presenting for the following health issues:  Allergy Consult (Concern for environmental allergies)    HPI     Chief complaint: Environmental allergies    History of present illness: This is a pleasant 49-year-old patient here today to discuss environmental allergies.  States that patient was skin tested this summer and positive to dust mites.  I do not have this record to review.  Would like to know if the patient is allergic to any other allergens.  Symptoms consist of nasal congestion pressure in the face and some soreness of the face.  Had been using Allegra which helped but did not completely resolve the nasal symptoms.  Flonase caused some bloody noses.  No asthma.  No cough, wheeze or shortness of breath currently.  Does note that the left ear feels plugged    Past medical history: Per the record history of depression and anxiety, ADHD    Social history: Works from home, does have a cat, has dust mite covers on bedding          Objective    Pulse 68   Resp 18   Wt 79.6 kg (175 lb 6.4 oz)   SpO2 97%   BMI 22.51 kg/m    Body mass index is 22.51 kg/m .  Physical Exam     Gen: Pleasant individual not in acute distress  HEENT: Eyes no erythema of the bulbar or palpebral conjunctiva, no edema. Ears: TMs well visualized, no effusions. Nose: No congestion, mucosa normal. Mouth: Throat clear, no lip or tongue edema.   Respiratory: Clear to auscultation bilaterally, no adventitious breath sounds  Skin: No rashes or lesions  Psych: Alert and oriented times 3    Impression report and plan:  1.  Allergic rhinitis to dust mite    Patient is wondering if we are missing other allergens.  Recommend specific IgE testing to the remaining environmental allergens.  Reviewed environmental control regarding dust mite.  Discussed options including adding Astelin nasal spray.  Patient notes significant fatigue so recommended avoiding Zyrtec.  Suggested either Odactra or allergy  immunotherapy but would need to obtain  outside records.    Signed Electronically by: Lizzette ROD MD

## 2024-09-24 LAB
A ALTERNATA IGE QN: <0.1 KU(A)/L
A FUMIGATUS IGE QN: <0.1 KU(A)/L
C HERBARUM IGE QN: <0.1 KU(A)/L
CALIF WALNUT POLN IGE QN: <0.1 KU(A)/L
CAT DANDER IGG QN: <0.1 KU(A)/L
COCKSFOOT IGE QN: <0.1 KU(A)/L
COMMON RAGWEED IGE QN: <0.1 KU(A)/L
COTTONWOOD IGE QN: <0.1 KU(A)/L
DOG DANDER+EPITH IGE QN: <0.1 KU(A)/L
E PURPURASCENS IGE QN: <0.1 KU(A)/L
ENGL PLANTAIN IGE QN: <0.1 KU(A)/L
GIANT RAGWEED IGE QN: <0.1 KU(A)/L
GOOSEFOOT IGE QN: <0.1 KU(A)/L
JOHNSON GRASS IGE QN: <0.1 KU(A)/L
MAPLE IGE QN: <0.1 KU(A)/L
MUGWORT IGE QN: <0.1 KU(A)/L
P NOTATUM IGE QN: <0.1 KU(A)/L
RED MULBERRY IGE QN: <0.1 KU(A)/L
SHEEP SORREL IGE QN: <0.1 KU(A)/L
SILVER BIRCH IGE QN: <0.1 KU(A)/L
TIMOTHY IGE QN: <0.1 KU(A)/L
WHITE ASH IGE QN: <0.1 KU(A)/L
WHITE ELM IGE QN: <0.1 KU(A)/L
WHITE MULBERRY IGE QN: <0.1 KU(A)/L
WHITE OAK IGE QN: <0.1 KU(A)/L
WORMWOOD IGE QN: <0.1 KU(A)/L

## 2024-10-24 DIAGNOSIS — J30.89 ALLERGIC RHINITIS DUE TO DUST MITE: ICD-10-CM

## 2024-10-24 DIAGNOSIS — Z91.09: Primary | ICD-10-CM

## 2024-10-24 DIAGNOSIS — Z51.6: Primary | ICD-10-CM

## 2024-10-24 RX ORDER — DERMATOPHAGOIDES PTERONYSSINUS AND DERMATOPHAGOIDES FARINAE 6; 6 [ARB'U]/1; [ARB'U]/1
1 TABLET SUBLINGUAL DAILY
Qty: 30 TABLET | Refills: 5 | Status: SHIPPED | OUTPATIENT
Start: 2024-10-24

## 2024-10-24 RX ORDER — EPINEPHRINE 0.3 MG/.3ML
INJECTION SUBCUTANEOUS
Qty: 2 EACH | Refills: 0 | Status: SHIPPED | OUTPATIENT
Start: 2024-10-24 | End: 2024-10-28

## 2024-10-25 ENCOUNTER — MYC MEDICAL ADVICE (OUTPATIENT)
Dept: ALLERGY | Facility: CLINIC | Age: 49
End: 2024-10-25
Payer: COMMERCIAL

## 2024-10-25 ENCOUNTER — TELEPHONE (OUTPATIENT)
Dept: ALLERGY | Facility: CLINIC | Age: 49
End: 2024-10-25
Payer: COMMERCIAL

## 2024-10-25 DIAGNOSIS — J30.89 ALLERGIC RHINITIS DUE TO DUST MITE: ICD-10-CM

## 2024-10-25 NOTE — TELEPHONE ENCOUNTER
[facility] Call Center    Phone Message    May a detailed message be left on voicemail: yes     Reason for Call: Appointment Intake    Referring Provider Name: NA   Diagnosis and/or Symptoms: Odactra pills like a shot, pt said he would like to schedule and this would be in clinic. I do not have enough information to know how to schedule properly or if I can. Please call pt back to schedule or discuss. Thanks     Action Taken: Message routed to:  Other: Emperatriz- allergy     Travel Screening: Not Applicable     Date of Service:

## 2024-10-28 ENCOUNTER — ALLIED HEALTH/NURSE VISIT (OUTPATIENT)
Dept: ALLERGY | Facility: CLINIC | Age: 49
End: 2024-10-28
Payer: COMMERCIAL

## 2024-10-28 DIAGNOSIS — Z76.89 ENCOUNTER FOR MEDICATION ADMINISTRATION: Primary | ICD-10-CM

## 2024-10-28 PROCEDURE — 99207 PR NO CHARGE LOS: CPT

## 2024-10-28 RX ORDER — EPINEPHRINE 0.3 MG/.3ML
INJECTION SUBCUTANEOUS
Qty: 2 EACH | Refills: 0 | Status: SHIPPED | OUTPATIENT
Start: 2024-10-28

## 2024-10-28 NOTE — PROGRESS NOTES
Gregory Nolasco presents to clinic today at the request of Lizzette Lowry MD (ordering provider) for  Odactra .       This service provided today was under the care of Lizzette Lowry MD; the supervising provider of the day; who was available if needed.      Patient presented after waiting 30 minutes with no reaction to  injections. Discharged from clinic.    Lynda Jorgensen RN

## 2025-02-01 SDOH — HEALTH STABILITY: PHYSICAL HEALTH: ON AVERAGE, HOW MANY DAYS PER WEEK DO YOU ENGAGE IN MODERATE TO STRENUOUS EXERCISE (LIKE A BRISK WALK)?: 5 DAYS

## 2025-02-01 SDOH — HEALTH STABILITY: PHYSICAL HEALTH: ON AVERAGE, HOW MANY MINUTES DO YOU ENGAGE IN EXERCISE AT THIS LEVEL?: 40 MIN

## 2025-02-01 ASSESSMENT — SOCIAL DETERMINANTS OF HEALTH (SDOH): HOW OFTEN DO YOU GET TOGETHER WITH FRIENDS OR RELATIVES?: NEVER

## 2025-02-06 ENCOUNTER — OFFICE VISIT (OUTPATIENT)
Dept: FAMILY MEDICINE | Facility: CLINIC | Age: 50
End: 2025-02-06
Attending: FAMILY MEDICINE
Payer: COMMERCIAL

## 2025-02-06 VITALS
HEIGHT: 75 IN | HEART RATE: 74 BPM | BODY MASS INDEX: 21.7 KG/M2 | SYSTOLIC BLOOD PRESSURE: 122 MMHG | TEMPERATURE: 97 F | DIASTOLIC BLOOD PRESSURE: 82 MMHG | OXYGEN SATURATION: 99 % | WEIGHT: 174.5 LBS

## 2025-02-06 DIAGNOSIS — Z00.00 VISIT FOR PREVENTIVE HEALTH EXAMINATION: Primary | ICD-10-CM

## 2025-02-06 DIAGNOSIS — Z13.220 LIPID SCREENING: ICD-10-CM

## 2025-02-06 DIAGNOSIS — R59.0 CERVICAL LYMPHADENOPATHY: ICD-10-CM

## 2025-02-06 DIAGNOSIS — R09.81 CHRONIC NASAL CONGESTION: ICD-10-CM

## 2025-02-06 DIAGNOSIS — J30.9 ALLERGIC RHINITIS, UNSPECIFIED SEASONALITY, UNSPECIFIED TRIGGER: ICD-10-CM

## 2025-02-06 LAB
CHOLEST SERPL-MCNC: 137 MG/DL
ERYTHROCYTE [DISTWIDTH] IN BLOOD BY AUTOMATED COUNT: 12.7 % (ref 10–15)
FASTING STATUS PATIENT QL REPORTED: YES
HCT VFR BLD AUTO: 48.4 % (ref 35–53)
HDLC SERPL-MCNC: 60 MG/DL
HGB BLD-MCNC: 16.3 G/DL (ref 11.7–17.7)
LDLC SERPL CALC-MCNC: 56 MG/DL
MCH RBC QN AUTO: 29.6 PG (ref 26.5–33)
MCHC RBC AUTO-ENTMCNC: 33.7 G/DL (ref 31.5–36.5)
MCV RBC AUTO: 88 FL (ref 78–100)
NONHDLC SERPL-MCNC: 77 MG/DL
PLATELET # BLD AUTO: 198 10E3/UL (ref 150–450)
RBC # BLD AUTO: 5.51 10E6/UL (ref 3.8–5.9)
TRIGL SERPL-MCNC: 103 MG/DL
WBC # BLD AUTO: 6.8 10E3/UL (ref 4–11)

## 2025-02-06 PROCEDURE — 99213 OFFICE O/P EST LOW 20 MIN: CPT | Mod: 25 | Performed by: FAMILY MEDICINE

## 2025-02-06 PROCEDURE — 85027 COMPLETE CBC AUTOMATED: CPT | Performed by: FAMILY MEDICINE

## 2025-02-06 PROCEDURE — 36415 COLL VENOUS BLD VENIPUNCTURE: CPT | Performed by: FAMILY MEDICINE

## 2025-02-06 PROCEDURE — 80061 LIPID PANEL: CPT | Performed by: FAMILY MEDICINE

## 2025-02-06 PROCEDURE — 99396 PREV VISIT EST AGE 40-64: CPT | Performed by: FAMILY MEDICINE

## 2025-02-06 RX ORDER — FLUTICASONE PROPIONATE 50 MCG
SPRAY, SUSPENSION (ML) NASAL
COMMUNITY
Start: 2024-10-01

## 2025-02-06 RX ORDER — MIRTAZAPINE 15 MG/1
TABLET, FILM COATED ORAL
COMMUNITY
Start: 2024-05-01

## 2025-02-06 RX ORDER — FEXOFENADINE HCL 180 MG/1
TABLET ORAL
COMMUNITY
Start: 2024-07-01

## 2025-02-06 ASSESSMENT — PATIENT HEALTH QUESTIONNAIRE - PHQ9
SUM OF ALL RESPONSES TO PHQ QUESTIONS 1-9: 7
SUM OF ALL RESPONSES TO PHQ QUESTIONS 1-9: 7
10. IF YOU CHECKED OFF ANY PROBLEMS, HOW DIFFICULT HAVE THESE PROBLEMS MADE IT FOR YOU TO DO YOUR WORK, TAKE CARE OF THINGS AT HOME, OR GET ALONG WITH OTHER PEOPLE: VERY DIFFICULT

## 2025-02-06 ASSESSMENT — PAIN SCALES - GENERAL: PAINLEVEL_OUTOF10: NO PAIN (0)

## 2025-02-06 NOTE — PROGRESS NOTES
"Assessment/Plan    Problem   Visit for Preventive Health Examination    Standard childhood vaccines - reports receiving outside [place]  STI screen - declines  Contraception - wife has IUD  See below for additional preventive orders     Cervical lymphadenopathy, left    Noted at 1/2024 preventive visit. At the time, presumed 2/2 recent COVID infection. Persistent. Also with chronic congestion symptoms. Denies frequent night sweats. Weight relatively stable over past year.  -check CT, CBC                          Allergic Rhinitis    Saw allergist. Started oral dust mite allergy med in fall. Symptom control ok when taking this, Flonase, fexofenadine. Stopped Flonase and fexofenadine a few weeks ago, but symptoms recurred. Restarted them 2 weeks ago. Still noting nasal and sinus congestion as well as occasional vertigo sensation. No dysphagia. No recent hoarseness. No recent fever, myalgias or cough. Possible septal deviation on exam.  -continue oral dust mite allergy med  -continue fexofenadine 180 mg/d  -continue nasal fluticasone daily  -discussed option of seeing ENT for structural evaluation. Gregory would like to pursue this       Orders Placed This Encounter   Procedures    CT Soft Tissue Neck w Contrast    Lipid panel reflex to direct LDL Fasting    CBC with platelets    Adult ENT  Referral     Next visit in 1 year for preventive.      ----  Chief complaint: Physical    Social History     Social History Narrative    -Grew up in [facility]    -Lives with wife    -No kids    -Not working currently    -Enjoys [employer] painting     Patient has been advised of split billing.    Exam  /82 (BP Location: Left arm)   Pulse 74   Temp 97  F (36.1  C) (Temporal)   Ht 1.895 m (6' 2.61\")   Wt 79.2 kg (174 lb 8 oz)   SpO2 99%   BMI 22.04 kg/m      Tympanic membranes normal b/l  EOMI without nystagmus, Gregory reports mild vertigo with glancing down & to left  Mild maxillary and frontal sinus tenderness " b/l  Inflamed nasal passages b/l, possible septal deviation  oropharynx without abnormal masses  Left cervical adenopathy  lung fields CTAB  heart with regular rate and rhythm, no murmurs

## 2025-02-06 NOTE — PROGRESS NOTES
Preventive Care Visit  [facility]  Reese Beltran MD, Family Medicine  Feb 6, 2025  {Provider  Link to SmartSet :244687}    {PROVIDER CHARTING PREFERENCE:289346}    River Jenkins is a 50 year old, presenting for the following:  Physical        2/6/2025     8:45 AM   Additional Questions   Roomed by Steve VILLA  ***  {MA/LPN/RN Pre-Provider Visit Orders- hCG/UA/Strep (Optional):465458}  {SUPERLIST (Optional):393958}  {additonal problems for provider to add (Optional):562497}  Health Care Directive  Patient does not have a Health Care Directive: {ADVANCE_DIRECTIVE_STATUS:776429}      2/1/2025   General Health   How would you rate your overall physical health? Excellent   Feel stress (tense, anxious, or unable to sleep) Rather much   (!) STRESS CONCERN      2/1/2025   Nutrition   Three or more servings of calcium each day? Yes   Diet: Vegetarian/vegan   How many servings of fruit and vegetables per day? 4 or more   How many sweetened beverages each day? 0-1         2/1/2025   Exercise   Days per week of moderate/strenous exercise 5 days   Average minutes spent exercising at this level 40 min         2/1/2025   Social Factors   Frequency of gathering with friends or relatives Never   Worry food won't last until get money to buy more No   Food not last or not have enough money for food? No   Do you have housing? (Housing is defined as stable permanent housing and does not include staying ouside in a car, in a tent, in an abandoned building, in an overnight shelter, or couch-surfing.) Yes   Are you worried about losing your housing? No   Lack of transportation? No   Unable to get utilities (heat,electricity)? No   (!) SOCIAL CONNECTIONS CONCERN      2/1/2025   Dental   Dentist two times every year? Yes         2/1/2025   TB Screening   Were you born outside of the [place]? No       Today's PHQ-9 Score:       2/6/2025     8:39 AM   PHQ-9 SCORE   PHQ-9 Total Score Duncan  "7 (Mild depression)   PHQ-9 Total Score 7        Patient-reported         2/1/2025   Substance Use   Alcohol more than 3/day or more than 7/wk No   Do you use any other substances recreationally? No     Social History     Tobacco Use    Smoking status: Never    Smokeless tobacco: Never   Vaping Use    Vaping status: Never Used     {Provider  If there are gaps in the social history shown above, please follow the link to update and then refresh the note Link to Social and Substance History :330870}        2/1/2025   One time HIV Screening   Previous HIV test? Yes         2/1/2025   STI Screening   New sexual partner(s) since last STI/HIV test? No   ASCVD Risk   The ASCVD Risk score (Abraham AU, et al., 2019) failed to calculate for the following reasons:    The valid total cholesterol range is 130 to 320 mg/dL        2/1/2025   Contraception/Family Planning   Questions about contraception or family planning No     {Provider  REQUIRED FOR AWV Use the storyboard to review patient history, after sections have been marked as reviewed, refresh note to capture documentation:348419}   Reviewed and updated as needed this visit by Provider                    {HISTORY OPTIONS (Optional):522597}    {ROS Picklists (Optional):382743}     Objective    Exam  /85 (BP Location: Left arm, Patient Position: Sitting, Cuff Size: Adult Regular)   Pulse 74   Temp 97  F (36.1  C) (Temporal)   Ht 1.895 m (6' 2.61\")   Wt 79.2 kg (174 lb 8 oz)   SpO2 99%   BMI 22.04 kg/m     Estimated body mass index is 22.04 kg/m  as calculated from the following:    Height as of this encounter: 1.895 m (6' 2.61\").    Weight as of this encounter: 79.2 kg (174 lb 8 oz).    Physical Exam  {Exam Choices (Optional):759249}        Signed Electronically by: Reese Beltran MD  {Email feedback regarding this note to [email or web address]   :228395}  "

## 2025-02-11 PROBLEM — Z78.9 VEGETARIAN: Status: ACTIVE | Noted: 2025-02-11

## 2025-04-16 ENCOUNTER — MYC REFILL (OUTPATIENT)
Dept: ALLERGY | Facility: CLINIC | Age: 50
End: 2025-04-16
Payer: COMMERCIAL

## 2025-04-16 DIAGNOSIS — J30.89 ALLERGIC RHINITIS DUE TO DUST MITE: ICD-10-CM

## 2025-04-17 RX ORDER — DERMATOPHAGOIDES PTERONYSSINUS AND DERMATOPHAGOIDES FARINAE 6; 6 [ARB'U]/1; [ARB'U]/1
TABLET SUBLINGUAL
Qty: 30 TABLET | Refills: 5 | Status: SHIPPED | OUTPATIENT
Start: 2025-04-17

## 2025-04-17 RX ORDER — DERMATOPHAGOIDES PTERONYSSINUS AND DERMATOPHAGOIDES FARINAE 6; 6 [ARB'U]/1; [ARB'U]/1
1 TABLET SUBLINGUAL DAILY
Qty: 30 TABLET | Refills: 5 | Status: SHIPPED | OUTPATIENT
Start: 2025-04-17

## 2025-04-28 ENCOUNTER — OFFICE VISIT (OUTPATIENT)
Dept: OTOLARYNGOLOGY | Facility: CLINIC | Age: 50
End: 2025-04-28
Attending: FAMILY MEDICINE
Payer: COMMERCIAL

## 2025-04-28 VITALS
BODY MASS INDEX: 22.03 KG/M2 | HEIGHT: 75 IN | DIASTOLIC BLOOD PRESSURE: 89 MMHG | WEIGHT: 177.2 LBS | HEART RATE: 86 BPM | OXYGEN SATURATION: 97 % | SYSTOLIC BLOOD PRESSURE: 120 MMHG

## 2025-04-28 DIAGNOSIS — J31.0 CHRONIC RHINITIS: Primary | ICD-10-CM

## 2025-04-28 DIAGNOSIS — R09.81 CHRONIC NASAL CONGESTION: ICD-10-CM

## 2025-04-28 PROCEDURE — 99202 OFFICE O/P NEW SF 15 MIN: CPT | Performed by: OTOLARYNGOLOGY

## 2025-04-28 ASSESSMENT — PAIN SCALES - GENERAL: PAINLEVEL_OUTOF10: NO PAIN (0)

## 2025-04-28 NOTE — NURSING NOTE
"Chief Complaint   Patient presents with    Consult   Blood pressure 120/89, pulse 86, height 1.892 m (6' 2.5\"), weight 80.4 kg (177 lb 3.2 oz), SpO2 97%. Jeff Muñiz, EMT    "

## 2025-04-28 NOTE — PROGRESS NOTES
[facility] CL[facility] [address] [address]  Phone: [phone]  Fax: [phone]    Patient:  Gregory Nolasco, Date of birth 1975  Date of Visit:  04/28/2025  Referring Provider Reese Beltran      Assessment & Plan  Chronic nasal congestion:  - Symptoms are classic for chronic allergic rhinitis, likely due to dust mite allergy. Exam and CT scan show normal sinus anatomy and nasal mucosa.  - Continue consistent use of nasal steroid spray. Add saline spray or saline rinsing to reduce nasal sensitivity. Antihistamines as needed during flare-ups. Consider switching nasal steroids if Flonase is not effective. Encourage hearing test for tinnitus.  - ICD-10 Code: J30.1 (Allergic rhinitis due to pollen)       20 minutes spent by me on the date of the encounter doing chart review, history and exam, documentation and further activities per the note       Rox Arreola MD            Otolaryngology Adult Consultation    HPI: Gregory Nolasco is a 50 year old adult seen today in the Otolaryngology Clinic in consultation from Reese Beltran for a history of nasal congestion and pressure.      History of Present Illness-Gregory Nolasco, 50-year-old male, reports worsening dust allergies over the past year, with flare-ups occurring every few months. Symptoms include pressure in the center of the forehead and across the nose, congestion, and increased coughing during flare-ups. He has been using Flonase and Allegra for treatment. Additionally, he experiences tinnitus, which is worse in the left ear.  A previous CT scan showed normal sinus anatomy.     Current Outpatient Medications   Medication Sig Dispense Refill    fexofenadine (ALLEGRA) 180 MG tablet       fluticasone (FLONASE) 50 MCG/ACT nasal spray       cyanocobalamin 1000 MCG tablet [CYANOCOBALAMIN 1000 MCG TABLET] Take 1,000 mcg by mouth daily.      EPINEPHrine (ANY BX GENERIC EQUIV)  0.3 MG/0.3ML injection 2-pack Inject into outer thigh for allergic reaction 2 each 0    House Dust Mite Allergen Extract (ODACTRA) sublingual tablet TAKE 1 TABLET UNDER TONGUE DAILY 30 tablet 5    House Dust Mite Allergen Extract (ODACTRA) sublingual tablet Place 1 tablet under the tongue daily. First dose should in be in the office 30 tablet 5    Methylphenidate HCl ER 18 MG 24H tablet       mirtazapine (REMERON) 15 MG tablet        No current facility-administered medications for this visit.          Allergies   Allergen Reactions    House Dust [Dust Mite Extract] Unknown       Past Medical History:   Diagnosis Date    Autism     Infection due to 2019 novel coronavirus        Social History     Occupational History    Not on file   Tobacco Use    Smoking status: Never    Smokeless tobacco: Never   Vaping Use    Vaping status: Never Used   Substance and Sexual Activity    Alcohol use: Yes     Alcohol/week: 3.0 standard drinks of alcohol     Types: 3 Standard drinks or equivalent per week    Drug use: Not Currently    Sexual activity: Yes     Partners: Female        Review of Systems      4/28/2025     3:12 PM   [facility] ROS   Constitutional Problems with sleep   Neurology Headache   Psychology Frequently feeling depressed or sad    Frequently feeling anxious   Ears, Nose, Throat Ringing/noise in ears    Nasal congestion or drainage   Allergy/Immunology Allergies or hay fever        14 point ROS neg other than the symptoms noted above.    Physical Exam:    Physical Exam- HEENT: Examination of ears shows tympanic membranes intact. Nasal mucosa and membranes appear normal, with nares patent. Oropharynx clear.       SNOT20: Sino-Nasal Outcome Test (SNOT - 22)  1. Need to Blow Nose: (Patient-Rptd) (P) Very mild  2. Nasal Blockage: (Patient-Rptd) (P) Moderate  3. Sneezing: (Patient-Rptd) (P) Very mild  4. Runny Nose: (Patient-Rptd) (P) None  5. Cough: (Patient-Rptd) (P) None  6. Post-nasal discharge: (Patient-Rptd) (P)  Very mild  7. Thick nasal discharge: (Patient-Rptd) (P) Very mild  8. Ear fullness: (Patient-Rptd) (P) Moderate, Severe  9. Dizziness: (Patient-Rptd) (P) Mild or slight  10. Ear Pain: (Patient-Rptd) (P) None  11. Facial pain/pressure: (Patient-Rptd) (P) Moderate  12. Decreased Sense of Smell/Taste: (Patient-Rptd) (P) None  13. Difficulty falling asleep: (Patient-Rptd) (P) Very mild  14. Wake up at night: (Patient-Rptd) (P) Moderate  15. Lack of a good night's sleep: (Patient-Rptd) (P) Moderate  16. Wake up tired: (Patient-Rptd) (P) Moderate  17. Fatigue: (Patient-Rptd) (P) Moderate, Severe  18. Reduced Productivity: (Patient-Rptd) (P) Moderate  19. Reduced Concentration: (Patient-Rptd) (P) Moderate  20. Frustrated/restless/irritable: (Patient-Rptd) (P) Mild or slight  21. Sad: (Patient-Rptd) (P) Moderate  22. Embarrassed: (Patient-Rptd) (P) None  Total Score: (Patient-Rptd) (P) 39    Imaging:    No results found for this or any previous visit.     Results- CT scan: Normal findings; septum straight, sinus anatomy appears normal.

## 2025-04-28 NOTE — LETTER
4/28/2025       RE: Gregory Nolasco  [address]  [place] [address]     Dear Colleague,    Thank you for referring your patient, Gregory Nolasco, to the [facility] CL[facility] [place] [facility]. Please see a copy of my visit note below.      [facility] [address]  4TH FLO[address]  [facility] [address]  Phone: [phone]  Fax: [phone]    Patient:  Gregory Nolasco, Date of birth 1975  Date of Visit:  04/28/2025  Referring Provider Reese Beltran      Assessment & Plan  Chronic nasal congestion:  - Symptoms are classic for chronic allergic rhinitis, likely due to dust mite allergy. Exam and CT scan show normal sinus anatomy and nasal mucosa.  - Continue consistent use of nasal steroid spray. Add saline spray or saline rinsing to reduce nasal sensitivity. Antihistamines as needed during flare-ups. Consider switching nasal steroids if Flonase is not effective. Encourage hearing test for tinnitus.  - ICD-10 Code: J30.1 (Allergic rhinitis due to pollen)       20 minutes spent by me on the date of the encounter doing chart review, history and exam, documentation and further activities per the note       Rox Arreola MD            Otolaryngology Adult Consultation    HPI: Gregory Nolasco is a 50 year old adult seen today in the Otolaryngology Clinic in consultation from Reese Beltran for a history of nasal congestion and pressure.      History of Present Illness-Gregory Nolasco, 50-year-old male, reports worsening dust allergies over the past year, with flare-ups occurring every few months. Symptoms include pressure in the center of the forehead and across the nose, congestion, and increased coughing during flare-ups. He has been using Flonase and Allegra for treatment. Additionally, he experiences tinnitus, which is worse in the left ear.  A previous CT scan  showed normal sinus anatomy.     Current Outpatient Medications   Medication Sig Dispense Refill     fexofenadine (ALLEGRA) 180 MG tablet        fluticasone (FLONASE) 50 MCG/ACT nasal spray        cyanocobalamin 1000 MCG tablet [CYANOCOBALAMIN 1000 MCG TABLET] Take 1,000 mcg by mouth daily.       EPINEPHrine (ANY BX GENERIC EQUIV) 0.3 MG/0.3ML injection 2-pack Inject into outer thigh for allergic reaction 2 each 0     House Dust Mite Allergen Extract (ODACTRA) sublingual tablet TAKE 1 TABLET UNDER TONGUE DAILY 30 tablet 5     House Dust Mite Allergen Extract (ODACTRA) sublingual tablet Place 1 tablet under the tongue daily. First dose should in be in the office 30 tablet 5     Methylphenidate HCl ER 18 MG 24H tablet        mirtazapine (REMERON) 15 MG tablet        No current facility-administered medications for this visit.          Allergies   Allergen Reactions     House Dust [Dust Mite Extract] Unknown       Past Medical History:   Diagnosis Date     Autism      Infection due to 2019 novel coronavirus        Social History     Occupational History     Not on file   Tobacco Use     Smoking status: Never     Smokeless tobacco: Never   Vaping Use     Vaping status: Never Used   Substance and Sexual Activity     Alcohol use: Yes     Alcohol/week: 3.0 standard drinks of alcohol     Types: 3 Standard drinks or equivalent per week     Drug use: Not Currently     Sexual activity: Yes     Partners: Female        Review of Systems      4/28/2025     3:12 PM   [facility] ROS   Constitutional Problems with sleep   Neurology Headache   Psychology Frequently feeling depressed or sad    Frequently feeling anxious   Ears, Nose, Throat Ringing/noise in ears    Nasal congestion or drainage   Allergy/Immunology Allergies or hay fever        14 point ROS neg other than the symptoms noted above.    Physical Exam:    Physical Exam- HEENT: Examination of ears shows tympanic membranes intact. Nasal mucosa and membranes appear normal, with  nares patent. Oropharynx clear.       SNOT20: Sino-Nasal Outcome Test (SNOT - 22)  1. Need to Blow Nose: (Patient-Rptd) (P) Very mild  2. Nasal Blockage: (Patient-Rptd) (P) Moderate  3. Sneezing: (Patient-Rptd) (P) Very mild  4. Runny Nose: (Patient-Rptd) (P) None  5. Cough: (Patient-Rptd) (P) None  6. Post-nasal discharge: (Patient-Rptd) (P) Very mild  7. Thick nasal discharge: (Patient-Rptd) (P) Very mild  8. Ear fullness: (Patient-Rptd) (P) Moderate, Severe  9. Dizziness: (Patient-Rptd) (P) Mild or slight  10. Ear Pain: (Patient-Rptd) (P) None  11. Facial pain/pressure: (Patient-Rptd) (P) Moderate  12. Decreased Sense of Smell/Taste: (Patient-Rptd) (P) None  13. Difficulty falling asleep: (Patient-Rptd) (P) Very mild  14. Wake up at night: (Patient-Rptd) (P) Moderate  15. Lack of a good night's sleep: (Patient-Rptd) (P) Moderate  16. Wake up tired: (Patient-Rptd) (P) Moderate  17. Fatigue: (Patient-Rptd) (P) Moderate, Severe  18. Reduced Productivity: (Patient-Rptd) (P) Moderate  19. Reduced Concentration: (Patient-Rptd) (P) Moderate  20. Frustrated/restless/irritable: (Patient-Rptd) (P) Mild or slight  21. Sad: (Patient-Rptd) (P) Moderate  22. Embarrassed: (Patient-Rptd) (P) None  Total Score: (Patient-Rptd) (P) 39    Imaging:    No results found for this or any previous visit.     Results- CT scan: Normal findings; septum straight, sinus anatomy appears normal.                Again, thank you for allowing me to participate in the care of your patient.      Sincerely,    Rox Arreola MD

## 2025-05-06 ENCOUNTER — OFFICE VISIT (OUTPATIENT)
Dept: ALLERGY | Facility: CLINIC | Age: 50
End: 2025-05-06
Payer: COMMERCIAL

## 2025-05-06 VITALS
HEART RATE: 70 BPM | BODY MASS INDEX: 22.5 KG/M2 | RESPIRATION RATE: 21 BRPM | OXYGEN SATURATION: 98 % | WEIGHT: 177.6 LBS

## 2025-05-06 DIAGNOSIS — J30.89 ALLERGIC RHINITIS DUE TO DUST MITE: ICD-10-CM

## 2025-05-06 PROCEDURE — 99213 OFFICE O/P EST LOW 20 MIN: CPT | Performed by: ALLERGY & IMMUNOLOGY

## 2025-05-06 RX ORDER — DERMATOPHAGOIDES PTERONYSSINUS AND DERMATOPHAGOIDES FARINAE 6; 6 [ARB'U]/1; [ARB'U]/1
1 TABLET SUBLINGUAL DAILY
Qty: 30 TABLET | Refills: 11 | Status: SHIPPED | OUTPATIENT
Start: 2025-05-06

## 2025-05-06 NOTE — PATIENT INSTRUCTIONS
Odactra daily     Flonase--nonallergic versus allergic reaction    Follow in 1 year    Vaseline to eye twice daily with Q-tip    (If rash continues, contact)

## 2025-05-06 NOTE — PROGRESS NOTES
River Jenkins is a 50 year old, presenting for the following health issues:  Allergy Recheck    HPI      Chief complaint: Allergy recheck    History of present illness: This is a pleasant 50-year-old patient here today for an allergy recheck.  Patient states that the patient has been using Odactra regularly since the fall.  Has noted improvement in nasal congestion and drainage.  Still needs to use Flonase, however.  Notes some irritation around the eye recently as well.  No reactions to Odactra and has a current epinephrine device          Objective    Pulse 70   Resp 21   Wt 80.6 kg (177 lb 9.6 oz)   SpO2 98%   BMI 22.50 kg/m    Body mass index is 22.5 kg/m .  Physical Exam   Gen: Pleasant male not in acute distress  HEENT: Eyes no erythema of the bulbar or palpebral conjunctiva, no edema. . Nose: No congestion, mucosa normal. Mouth: Throat small amount of drainage no lip or tongue edema.   Respiratory: Clear to auscultation bilaterally, no adventitious breath sounds  Skin: Small amount of darkening to the skin below both eyes  Psych: Alert and oriented times 3      Impression report and plan:  1.  Allergic rhinitis to dust mite      Continue Odactra.  Follow yearly.  Has current epinephrine device and understands when to use it.  Stated may have a component of nonallergic rhinitis and may still require Flonase.  For eye irritation recommended Vaseline around the eye.  If this does not improve symptoms I can prescribe him hydrocortisone 2.5%.            Signed Electronically by: Lizzette Lowry MD

## 2025-05-06 NOTE — LETTER
5/6/2025      Gregory Nolasco  [address] [place] [address]      Dear Colleague,    Thank you for referring your patient, Gregory Nolasco, to the [facility]. Please see a copy of my visit note below.          Subjective  Gregory is a 50 year old, presenting for the following health issues:  Allergy Recheck    HPI      Chief complaint: Allergy recheck    History of present illness: This is a pleasant 50-year-old patient here today for an allergy recheck.  Patient states that the patient has been using Odactra regularly since the fall.  Has noted improvement in nasal congestion and drainage.  Still needs to use Flonase, however.  Notes some irritation around the eye recently as well.  No reactions to Odactra and has a current epinephrine device          Objective   Pulse 70   Resp 21   Wt 80.6 kg (177 lb 9.6 oz)   SpO2 98%   BMI 22.50 kg/m    Body mass index is 22.5 kg/m .  Physical Exam   Gen: Pleasant male not in acute distress  HEENT: Eyes no erythema of the bulbar or palpebral conjunctiva, no edema. . Nose: No congestion, mucosa normal. Mouth: Throat small amount of drainage no lip or tongue edema.   Respiratory: Clear to auscultation bilaterally, no adventitious breath sounds  Skin: Small amount of darkening to the skin below both eyes  Psych: Alert and oriented times 3      Impression report and plan:  1.  Allergic rhinitis to dust mite      Continue Odactra.  Follow yearly.  Has current epinephrine device and understands when to use it.  Stated may have a component of nonallergic rhinitis and may still require Flonase.  For eye irritation recommended Vaseline around the eye.  If this does not improve symptoms I can prescribe him hydrocortisone 2.5%.            Signed Electronically by: Lizzette Lowry MD        Again, thank you for allowing me to participate in the care of your patient.        Sincerely,        Lizzette Lowry MD    Electronically signed